# Patient Record
Sex: MALE | Race: WHITE | NOT HISPANIC OR LATINO | Employment: FULL TIME | URBAN - METROPOLITAN AREA
[De-identification: names, ages, dates, MRNs, and addresses within clinical notes are randomized per-mention and may not be internally consistent; named-entity substitution may affect disease eponyms.]

---

## 2021-09-17 ENCOUNTER — OFFICE VISIT (OUTPATIENT)
Dept: UROLOGY | Facility: CLINIC | Age: 56
End: 2021-09-17

## 2021-09-17 VITALS
DIASTOLIC BLOOD PRESSURE: 90 MMHG | HEART RATE: 79 BPM | WEIGHT: 240 LBS | TEMPERATURE: 97.6 F | HEIGHT: 76 IN | BODY MASS INDEX: 29.22 KG/M2 | SYSTOLIC BLOOD PRESSURE: 135 MMHG

## 2021-09-17 DIAGNOSIS — C61 PROSTATE CANCER (HCC): Primary | ICD-10-CM

## 2021-09-17 LAB
BACTERIA UR QL AUTO: NORMAL /HPF
EPI CELLS #/AREA URNS HPF: 0 /[HPF]
PSA SERPL-MCNC: <0.014 NG/ML (ref 0–4)
RBC # UR STRIP: NORMAL /HPF
RENAL EPITHELIAL, POC: 0
UNIDENT CRYS URNS QL MICRO: NORMAL /HPF
WBC # UR STRIP: NORMAL /HPF

## 2021-09-17 PROCEDURE — 99203 OFFICE O/P NEW LOW 30 MIN: CPT | Performed by: UROLOGY

## 2021-09-17 PROCEDURE — 84153 ASSAY OF PSA TOTAL: CPT | Performed by: UROLOGY

## 2021-09-17 PROCEDURE — 81015 MICROSCOPIC EXAM OF URINE: CPT | Performed by: UROLOGY

## 2021-09-17 RX ORDER — CALCIUM CARBONATE 300MG(750)
400 TABLET,CHEWABLE ORAL DAILY
COMMUNITY
End: 2022-04-13 | Stop reason: ALTCHOICE

## 2021-09-17 RX ORDER — CHLORTHALIDONE 25 MG/1
25 TABLET ORAL DAILY
COMMUNITY
End: 2022-03-18 | Stop reason: SDUPTHER

## 2021-09-17 RX ORDER — AMLODIPINE BESYLATE 10 MG/1
10 TABLET ORAL DAILY
COMMUNITY
End: 2021-12-20 | Stop reason: SDUPTHER

## 2021-09-17 RX ORDER — ACETAMINOPHEN 325 MG/1
650 TABLET ORAL EVERY 6 HOURS PRN
COMMUNITY
End: 2021-09-27

## 2021-09-17 RX ORDER — METOPROLOL TARTRATE 100 MG/1
100 TABLET ORAL 2 TIMES DAILY
COMMUNITY
End: 2021-09-21

## 2021-09-17 RX ORDER — TADALAFIL 5 MG/1
5 TABLET ORAL DAILY PRN
COMMUNITY
End: 2021-09-21

## 2021-09-17 RX ORDER — ROSUVASTATIN CALCIUM 20 MG/1
20 TABLET, COATED ORAL DAILY
COMMUNITY
End: 2021-10-19 | Stop reason: SDUPTHER

## 2021-09-17 NOTE — PROGRESS NOTES
Chief Complaint  Prostate Cancer (21 psa 0.1 ,2/3/21 psa 0.1)    Subjective No acute pain        Peter Crook presents to [facility] UROLOGY  History of Present Illness    Patient has prostate cancer and history of prostate cancer in his father and uncles from both sides.  He had elevated PSA and underwent biopsy in 2019.  Patient had sepsis after the biopsy and continue to regular tract infection still had TUR prostate done in 2020.  Biopsy was done because of elevated PSA and it was negative for prostate cancer.  On history of prostate he had Talala grade 3+4 prostate carcinoma and underwent radical prostatectomy on 2020.  He was found to have positive lymph nodes in the pelvis.  Patient underwent radiation in 2020 till 2020.  His PSA in 2021 was less than 0.1.  Patient has been on Depo-Lupron and moved to [place] in 2021.    Has been having some fatigue, dry mouth, incontinence is worse than before and since mid July he has been having terminal gross hematuria.  No dysuria.  Has been having incontinence and wearing thick pads about 4 pads a day.  No incontinence at nighttime so obviously related to weightbearing.  No fever or chills but does have hot flashes.  No nausea vomiting and his appetite is down.  His father was diagnosed with prostate cancer at age 56 and  at age 82.  Patient also has erectile dysfunction.    Objective No acute distress.    Personal history.  Patient is  and has 1 son 29-year-old.  He is ex-smoker used to smoke 20 half pack a day for 16 years.  Drinks sometimes.    Past surgical history.  Appendectomy at 7 years of age.  TUR prostate as above.  He had bilateral vasectomy spermatocelectomy and hydrocelectomy.  Cystoscopy for benign growth    Colonoscopy    Past medical history cardiovascular system.  He has atrial fibrillation and is on Eliquis 5 mg twice a day.  Vital Signs:   /90   Pulse 79   " Temp 97.6 °F (36.4 °C)   Ht 193 cm (76\")   Wt 109 kg (240 lb)   BMI 29.21 kg/m²     No Known Allergies   Review of Systems   Constitutional: Positive for fatigue.   HENT: Negative.    Eyes: Negative.    Respiratory: Negative.    Cardiovascular: Positive for palpitations.   Gastrointestinal: Negative.    Endocrine: Negative.    Genitourinary: Negative.         Urinary incontinence   Musculoskeletal: Negative.    Skin: Negative.    Allergic/Immunologic: Negative.    Neurological: Negative.    Hematological: Negative.    Psychiatric/Behavioral: Negative.    All other systems reviewed and are negative.       Physical Exam  Constitutional:       Appearance: Normal appearance. He is normal weight.   HENT:      Head: Normocephalic and atraumatic.      Nose: Nose normal.   Cardiovascular:      Rate and Rhythm: Normal rate. Rhythm irregular.      Pulses: Normal pulses.      Heart sounds: Normal heart sounds. No murmur heard.     Pulmonary:      Effort: Pulmonary effort is normal. No respiratory distress.      Breath sounds: Normal breath sounds. No rhonchi or rales.   Abdominal:      General: Abdomen is flat. Bowel sounds are normal. There is no distension.      Palpations: Abdomen is soft.      Tenderness: There is no abdominal tenderness. There is no right CVA tenderness or left CVA tenderness.   Genitourinary:     Penis: Normal.       Testes: Normal.      Rectum: Normal.      Comments: Prostate gland is surgically absent  Musculoskeletal:         General: No swelling or deformity. Normal range of motion.      Cervical back: Normal range of motion and neck supple. No rigidity or tenderness.   Lymphadenopathy:      Cervical: No cervical adenopathy.   Skin:     General: Skin is warm.      Coloration: Skin is not jaundiced.   Neurological:      General: No focal deficit present.      Mental Status: He is alert and oriented to person, place, and time.      Motor: No weakness.      Gait: Gait normal.   Psychiatric:         " Mood and Affect: Mood normal.         Behavior: Behavior normal.         Thought Content: Thought content normal.         Judgment: Judgment normal.        Result Review :                 Assessment and Plan    Diagnoses and all orders for this visit:    1. Prostate cancer (CMS/HCC) (Primary)  -     POC Urinalysis Dipstick, Automated  -     PSA Diagnostic; Future    2.  Terminal hematuria    3.  Urinary incontinence    Discussed the situation with the patient and nursing has we can get authorization will restart him on Depo-Lupron.  I am going to do CBC and CMP along with H1 see to make sure were not dealing with diabetes mellitus.  I think the patient should also be on apalutamide along with Depo-Lupron.  We are going to cystoscopy on him because of history of a benign bladder tumor and terminal hematuria.  Radiation cystitis will be another possibility    Follow Up   No follow-ups on file.  Patient was given instructions and counseling regarding his condition or for health maintenance advice. Please see specific information pulled into the AVS if appropriate.     Filiberto Sykes MD

## 2021-09-20 ENCOUNTER — APPOINTMENT (OUTPATIENT)
Dept: GENERAL RADIOLOGY | Facility: HOSPITAL | Age: 56
End: 2021-09-20

## 2021-09-20 ENCOUNTER — LAB (OUTPATIENT)
Dept: LAB | Facility: HOSPITAL | Age: 56
End: 2021-09-20

## 2021-09-20 ENCOUNTER — APPOINTMENT (OUTPATIENT)
Dept: LAB | Facility: HOSPITAL | Age: 56
End: 2021-09-20

## 2021-09-20 ENCOUNTER — HOSPITAL ENCOUNTER (INPATIENT)
Facility: HOSPITAL | Age: 56
LOS: 2 days | Discharge: HOME | End: 2021-09-23
Attending: EMERGENCY MEDICINE | Admitting: INTERNAL MEDICINE

## 2021-09-20 DIAGNOSIS — C61 PROSTATE CANCER (HCC): ICD-10-CM

## 2021-09-20 DIAGNOSIS — C61 PROSTATE CANCER (HCC): Primary | ICD-10-CM

## 2021-09-20 DIAGNOSIS — E11.9 NEW ONSET TYPE 2 DIABETES MELLITUS (HCC): ICD-10-CM

## 2021-09-20 DIAGNOSIS — R73.9 HYPERGLYCEMIA: Primary | ICD-10-CM

## 2021-09-20 LAB
ACETONE BLD QL: NEGATIVE
ALBUMIN SERPL-MCNC: 4.2 G/DL (ref 3.5–5.2)
ALBUMIN SERPL-MCNC: 4.3 G/DL (ref 3.5–5.2)
ALBUMIN/GLOB SERPL: 1.4 G/DL
ALBUMIN/GLOB SERPL: 1.5 G/DL
ALP SERPL-CCNC: 129 U/L (ref 39–117)
ALP SERPL-CCNC: 147 U/L (ref 39–117)
ALT SERPL W P-5'-P-CCNC: 64 U/L (ref 1–41)
ALT SERPL W P-5'-P-CCNC: 69 U/L (ref 1–41)
ANION GAP SERPL CALCULATED.3IONS-SCNC: 12.4 MMOL/L (ref 5–15)
ANION GAP SERPL CALCULATED.3IONS-SCNC: 12.9 MMOL/L (ref 5–15)
AST SERPL-CCNC: 54 U/L (ref 1–40)
AST SERPL-CCNC: 56 U/L (ref 1–40)
BASOPHILS # BLD AUTO: 0.02 10*3/MM3 (ref 0–0.2)
BASOPHILS # BLD AUTO: 0.03 10*3/MM3 (ref 0–0.2)
BASOPHILS NFR BLD AUTO: 0.6 % (ref 0–1.5)
BASOPHILS NFR BLD AUTO: 0.7 % (ref 0–1.5)
BILIRUB SERPL-MCNC: 0.5 MG/DL (ref 0–1.2)
BILIRUB SERPL-MCNC: 0.6 MG/DL (ref 0–1.2)
BILIRUB UR QL STRIP: NEGATIVE
BUN SERPL-MCNC: 15 MG/DL (ref 6–20)
BUN SERPL-MCNC: 16 MG/DL (ref 6–20)
BUN/CREAT SERPL: 15.2 (ref 7–25)
BUN/CREAT SERPL: 18.8 (ref 7–25)
CALCIUM SPEC-SCNC: 10.2 MG/DL (ref 8.6–10.5)
CALCIUM SPEC-SCNC: 9.7 MG/DL (ref 8.6–10.5)
CHLORIDE SERPL-SCNC: 91 MMOL/L (ref 98–107)
CHLORIDE SERPL-SCNC: 93 MMOL/L (ref 98–107)
CLARITY UR: CLEAR
CO2 SERPL-SCNC: 27.1 MMOL/L (ref 22–29)
CO2 SERPL-SCNC: 27.6 MMOL/L (ref 22–29)
COLOR UR: YELLOW
CREAT SERPL-MCNC: 0.85 MG/DL (ref 0.76–1.27)
CREAT SERPL-MCNC: 0.99 MG/DL (ref 0.76–1.27)
DEPRECATED RDW RBC AUTO: 40.9 FL (ref 37–54)
DEPRECATED RDW RBC AUTO: 42.8 FL (ref 37–54)
EOSINOPHIL # BLD AUTO: 0.05 10*3/MM3 (ref 0–0.4)
EOSINOPHIL # BLD AUTO: 0.05 10*3/MM3 (ref 0–0.4)
EOSINOPHIL NFR BLD AUTO: 1.2 % (ref 0.3–6.2)
EOSINOPHIL NFR BLD AUTO: 1.4 % (ref 0.3–6.2)
ERYTHROCYTE [DISTWIDTH] IN BLOOD BY AUTOMATED COUNT: 13.7 % (ref 12.3–15.4)
ERYTHROCYTE [DISTWIDTH] IN BLOOD BY AUTOMATED COUNT: 13.8 % (ref 12.3–15.4)
GFR SERPL CREATININE-BSD FRML MDRD: 78 ML/MIN/1.73
GFR SERPL CREATININE-BSD FRML MDRD: 94 ML/MIN/1.73
GLOBULIN UR ELPH-MCNC: 2.8 GM/DL
GLOBULIN UR ELPH-MCNC: 2.9 GM/DL
GLUCOSE BLDC GLUCOMTR-MCNC: >500 MG/DL (ref 70–99)
GLUCOSE BLDC GLUCOMTR-MCNC: >500 MG/DL (ref 70–99)
GLUCOSE SERPL-MCNC: 597 MG/DL (ref 65–99)
GLUCOSE SERPL-MCNC: 658 MG/DL (ref 65–99)
GLUCOSE UR STRIP-MCNC: ABNORMAL MG/DL
HCT VFR BLD AUTO: 37.2 % (ref 37.5–51)
HCT VFR BLD AUTO: 39.6 % (ref 37.5–51)
HGB BLD-MCNC: 12.5 G/DL (ref 13–17.7)
HGB BLD-MCNC: 12.8 G/DL (ref 13–17.7)
HGB UR QL STRIP.AUTO: NEGATIVE
HOLD SPECIMEN: NORMAL
HOLD SPECIMEN: NORMAL
IMM GRANULOCYTES # BLD AUTO: 0.01 10*3/MM3 (ref 0–0.05)
IMM GRANULOCYTES # BLD AUTO: 0.01 10*3/MM3 (ref 0–0.05)
IMM GRANULOCYTES NFR BLD AUTO: 0.2 % (ref 0–0.5)
IMM GRANULOCYTES NFR BLD AUTO: 0.3 % (ref 0–0.5)
KETONES UR QL STRIP: NEGATIVE
LEUKOCYTE ESTERASE UR QL STRIP.AUTO: NEGATIVE
LYMPHOCYTES # BLD AUTO: 0.76 10*3/MM3 (ref 0.7–3.1)
LYMPHOCYTES # BLD AUTO: 0.93 10*3/MM3 (ref 0.7–3.1)
LYMPHOCYTES NFR BLD AUTO: 21.3 % (ref 19.6–45.3)
LYMPHOCYTES NFR BLD AUTO: 22.6 % (ref 19.6–45.3)
MAGNESIUM SERPL-MCNC: 2.1 MG/DL (ref 1.6–2.6)
MCH RBC QN AUTO: 27.6 PG (ref 26.6–33)
MCH RBC QN AUTO: 27.7 PG (ref 26.6–33)
MCHC RBC AUTO-ENTMCNC: 32.3 G/DL (ref 31.5–35.7)
MCHC RBC AUTO-ENTMCNC: 33.6 G/DL (ref 31.5–35.7)
MCV RBC AUTO: 82.3 FL (ref 79–97)
MCV RBC AUTO: 85.5 FL (ref 79–97)
MONOCYTES # BLD AUTO: 0.25 10*3/MM3 (ref 0.1–0.9)
MONOCYTES # BLD AUTO: 0.27 10*3/MM3 (ref 0.1–0.9)
MONOCYTES NFR BLD AUTO: 6.6 % (ref 5–12)
MONOCYTES NFR BLD AUTO: 7 % (ref 5–12)
NEUTROPHILS NFR BLD AUTO: 2.48 10*3/MM3 (ref 1.7–7)
NEUTROPHILS NFR BLD AUTO: 2.82 10*3/MM3 (ref 1.7–7)
NEUTROPHILS NFR BLD AUTO: 68.7 % (ref 42.7–76)
NEUTROPHILS NFR BLD AUTO: 69.4 % (ref 42.7–76)
NITRITE UR QL STRIP: NEGATIVE
NRBC BLD AUTO-RTO: 0 /100 WBC (ref 0–0.2)
NRBC BLD AUTO-RTO: 0 /100 WBC (ref 0–0.2)
PH UR STRIP.AUTO: 7 [PH] (ref 5–8)
PLATELET # BLD AUTO: 159 10*3/MM3 (ref 140–450)
PLATELET # BLD AUTO: 160 10*3/MM3 (ref 140–450)
PMV BLD AUTO: 10.1 FL (ref 6–12)
PMV BLD AUTO: 10.9 FL (ref 6–12)
POTASSIUM SERPL-SCNC: 4.4 MMOL/L (ref 3.5–5.2)
POTASSIUM SERPL-SCNC: 4.8 MMOL/L (ref 3.5–5.2)
PROT SERPL-MCNC: 7.1 G/DL (ref 6–8.5)
PROT SERPL-MCNC: 7.1 G/DL (ref 6–8.5)
PROT UR QL STRIP: NEGATIVE
PSA SERPL-MCNC: <0.014 NG/ML (ref 0–4)
RBC # BLD AUTO: 4.52 10*6/MM3 (ref 4.14–5.8)
RBC # BLD AUTO: 4.63 10*6/MM3 (ref 4.14–5.8)
SODIUM SERPL-SCNC: 131 MMOL/L (ref 136–145)
SODIUM SERPL-SCNC: 133 MMOL/L (ref 136–145)
SP GR UR STRIP: >1.03 (ref 1–1.03)
UROBILINOGEN UR QL STRIP: ABNORMAL
WBC # BLD AUTO: 3.57 10*3/MM3 (ref 3.4–10.8)
WBC # BLD AUTO: 4.11 10*3/MM3 (ref 3.4–10.8)
WHOLE BLOOD HOLD SPECIMEN: NORMAL
WHOLE BLOOD HOLD SPECIMEN: NORMAL

## 2021-09-20 PROCEDURE — 93010 ELECTROCARDIOGRAM REPORT: CPT | Performed by: INTERNAL MEDICINE

## 2021-09-20 PROCEDURE — 36415 COLL VENOUS BLD VENIPUNCTURE: CPT

## 2021-09-20 PROCEDURE — 99285 EMERGENCY DEPT VISIT HI MDM: CPT

## 2021-09-20 PROCEDURE — 99223 1ST HOSP IP/OBS HIGH 75: CPT | Performed by: FAMILY MEDICINE

## 2021-09-20 PROCEDURE — 93005 ELECTROCARDIOGRAM TRACING: CPT | Performed by: EMERGENCY MEDICINE

## 2021-09-20 PROCEDURE — 83735 ASSAY OF MAGNESIUM: CPT | Performed by: EMERGENCY MEDICINE

## 2021-09-20 PROCEDURE — 80053 COMPREHEN METABOLIC PANEL: CPT | Performed by: EMERGENCY MEDICINE

## 2021-09-20 PROCEDURE — 82962 GLUCOSE BLOOD TEST: CPT

## 2021-09-20 PROCEDURE — 85025 COMPLETE CBC W/AUTO DIFF WBC: CPT

## 2021-09-20 PROCEDURE — 81003 URINALYSIS AUTO W/O SCOPE: CPT

## 2021-09-20 PROCEDURE — 82009 KETONE BODYS QUAL: CPT

## 2021-09-20 PROCEDURE — 84153 ASSAY OF PSA TOTAL: CPT

## 2021-09-20 PROCEDURE — 71045 X-RAY EXAM CHEST 1 VIEW: CPT

## 2021-09-20 PROCEDURE — 80053 COMPREHEN METABOLIC PANEL: CPT

## 2021-09-20 PROCEDURE — 83036 HEMOGLOBIN GLYCOSYLATED A1C: CPT | Performed by: FAMILY MEDICINE

## 2021-09-20 PROCEDURE — 63710000001 INSULIN LISPRO (HUMAN) PER 5 UNITS: Performed by: EMERGENCY MEDICINE

## 2021-09-20 RX ORDER — SODIUM CHLORIDE 0.9 % (FLUSH) 0.9 %
10 SYRINGE (ML) INJECTION AS NEEDED
Status: DISCONTINUED | OUTPATIENT
Start: 2021-09-20 | End: 2021-09-21

## 2021-09-20 RX ADMIN — INSULIN LISPRO 7 UNITS: 100 INJECTION, SOLUTION INTRAVENOUS; SUBCUTANEOUS at 23:12

## 2021-09-20 RX ADMIN — SODIUM CHLORIDE 1000 ML: 9 INJECTION, SOLUTION INTRAVENOUS at 23:13

## 2021-09-21 LAB
ALBUMIN SERPL-MCNC: 4.2 G/DL (ref 3.5–5.2)
ALBUMIN/GLOB SERPL: 1.7 G/DL
ALP SERPL-CCNC: 114 U/L (ref 39–117)
ALT SERPL W P-5'-P-CCNC: 61 U/L (ref 1–41)
ANION GAP SERPL CALCULATED.3IONS-SCNC: 8.4 MMOL/L (ref 5–15)
AST SERPL-CCNC: 57 U/L (ref 1–40)
BASOPHILS # BLD AUTO: 0.03 10*3/MM3 (ref 0–0.2)
BASOPHILS NFR BLD AUTO: 0.8 % (ref 0–1.5)
BILIRUB SERPL-MCNC: 0.5 MG/DL (ref 0–1.2)
BUN SERPL-MCNC: 14 MG/DL (ref 6–20)
BUN/CREAT SERPL: 17.7 (ref 7–25)
CALCIUM SPEC-SCNC: 9.7 MG/DL (ref 8.6–10.5)
CHLORIDE SERPL-SCNC: 96 MMOL/L (ref 98–107)
CO2 SERPL-SCNC: 29.6 MMOL/L (ref 22–29)
CREAT SERPL-MCNC: 0.79 MG/DL (ref 0.76–1.27)
DEPRECATED RDW RBC AUTO: 40 FL (ref 37–54)
EOSINOPHIL # BLD AUTO: 0.07 10*3/MM3 (ref 0–0.4)
EOSINOPHIL NFR BLD AUTO: 1.9 % (ref 0.3–6.2)
ERYTHROCYTE [DISTWIDTH] IN BLOOD BY AUTOMATED COUNT: 13.8 % (ref 12.3–15.4)
GFR SERPL CREATININE-BSD FRML MDRD: 102 ML/MIN/1.73
GLOBULIN UR ELPH-MCNC: 2.5 GM/DL
GLUCOSE BLDC GLUCOMTR-MCNC: 231 MG/DL (ref 70–99)
GLUCOSE BLDC GLUCOMTR-MCNC: 244 MG/DL (ref 70–99)
GLUCOSE BLDC GLUCOMTR-MCNC: 247 MG/DL (ref 70–99)
GLUCOSE BLDC GLUCOMTR-MCNC: 275 MG/DL (ref 70–99)
GLUCOSE BLDC GLUCOMTR-MCNC: 285 MG/DL (ref 70–99)
GLUCOSE BLDC GLUCOMTR-MCNC: 295 MG/DL (ref 70–99)
GLUCOSE BLDC GLUCOMTR-MCNC: 297 MG/DL (ref 70–99)
GLUCOSE BLDC GLUCOMTR-MCNC: 303 MG/DL (ref 70–99)
GLUCOSE BLDC GLUCOMTR-MCNC: 316 MG/DL (ref 70–99)
GLUCOSE BLDC GLUCOMTR-MCNC: 325 MG/DL (ref 70–99)
GLUCOSE BLDC GLUCOMTR-MCNC: 335 MG/DL (ref 70–99)
GLUCOSE BLDC GLUCOMTR-MCNC: 339 MG/DL (ref 70–99)
GLUCOSE BLDC GLUCOMTR-MCNC: 346 MG/DL (ref 70–99)
GLUCOSE BLDC GLUCOMTR-MCNC: 430 MG/DL (ref 70–99)
GLUCOSE BLDC GLUCOMTR-MCNC: >500 MG/DL (ref 70–99)
GLUCOSE SERPL-MCNC: 307 MG/DL (ref 65–99)
HBA1C MFR BLD: 12.2 % (ref 4.8–5.6)
HCT VFR BLD AUTO: 37.3 % (ref 37.5–51)
HGB BLD-MCNC: 12.7 G/DL (ref 13–17.7)
HYPOCHROMIA BLD QL: NORMAL
IMM GRANULOCYTES # BLD AUTO: 0.01 10*3/MM3 (ref 0–0.05)
IMM GRANULOCYTES NFR BLD AUTO: 0.3 % (ref 0–0.5)
LYMPHOCYTES # BLD AUTO: 0.75 10*3/MM3 (ref 0.7–3.1)
LYMPHOCYTES NFR BLD AUTO: 20.2 % (ref 19.6–45.3)
MCH RBC QN AUTO: 27.7 PG (ref 26.6–33)
MCHC RBC AUTO-ENTMCNC: 34 G/DL (ref 31.5–35.7)
MCV RBC AUTO: 81.4 FL (ref 79–97)
MICROCYTES BLD QL: NORMAL
MONOCYTES # BLD AUTO: 0.23 10*3/MM3 (ref 0.1–0.9)
MONOCYTES NFR BLD AUTO: 6.2 % (ref 5–12)
NEUTROPHILS NFR BLD AUTO: 2.62 10*3/MM3 (ref 1.7–7)
NEUTROPHILS NFR BLD AUTO: 70.6 % (ref 42.7–76)
NRBC BLD AUTO-RTO: 0 /100 WBC (ref 0–0.2)
PH BLDV: 7.39 PH UNITS (ref 7.31–7.41)
PLATELET # BLD AUTO: 176 10*3/MM3 (ref 140–450)
PMV BLD AUTO: 11.6 FL (ref 6–12)
POTASSIUM SERPL-SCNC: 3.7 MMOL/L (ref 3.5–5.2)
PROT SERPL-MCNC: 6.7 G/DL (ref 6–8.5)
QT INTERVAL: 458 MS
RBC # BLD AUTO: 4.58 10*6/MM3 (ref 4.14–5.8)
SMALL PLATELETS BLD QL SMEAR: NORMAL
SODIUM SERPL-SCNC: 134 MMOL/L (ref 136–145)
WBC # BLD AUTO: 3.71 10*3/MM3 (ref 3.4–10.8)
WBC MORPH BLD: NORMAL

## 2021-09-21 PROCEDURE — 85025 COMPLETE CBC W/AUTO DIFF WBC: CPT | Performed by: FAMILY MEDICINE

## 2021-09-21 PROCEDURE — 82800 BLOOD PH: CPT | Performed by: EMERGENCY MEDICINE

## 2021-09-21 PROCEDURE — 82962 GLUCOSE BLOOD TEST: CPT

## 2021-09-21 PROCEDURE — 80053 COMPREHEN METABOLIC PANEL: CPT | Performed by: FAMILY MEDICINE

## 2021-09-21 PROCEDURE — 85007 BL SMEAR W/DIFF WBC COUNT: CPT | Performed by: FAMILY MEDICINE

## 2021-09-21 PROCEDURE — 63710000001 INSULIN DETEMIR PER 5 UNITS: Performed by: INTERNAL MEDICINE

## 2021-09-21 PROCEDURE — 99233 SBSQ HOSP IP/OBS HIGH 50: CPT | Performed by: INTERNAL MEDICINE

## 2021-09-21 PROCEDURE — 63710000001 INSULIN LISPRO (HUMAN) PER 5 UNITS: Performed by: INTERNAL MEDICINE

## 2021-09-21 RX ORDER — SODIUM CHLORIDE 0.9 % (FLUSH) 0.9 %
10 SYRINGE (ML) INJECTION AS NEEDED
Status: DISCONTINUED | OUTPATIENT
Start: 2021-09-21 | End: 2021-09-23 | Stop reason: HOSPADM

## 2021-09-21 RX ORDER — CHLORTHALIDONE 25 MG/1
25 TABLET ORAL DAILY
Status: DISCONTINUED | OUTPATIENT
Start: 2021-09-21 | End: 2021-09-23 | Stop reason: HOSPADM

## 2021-09-21 RX ORDER — POLYETHYLENE GLYCOL 3350 17 G/17G
17 POWDER, FOR SOLUTION ORAL DAILY PRN
Status: DISCONTINUED | OUTPATIENT
Start: 2021-09-21 | End: 2021-09-23 | Stop reason: HOSPADM

## 2021-09-21 RX ORDER — SODIUM CHLORIDE 0.9 % (FLUSH) 0.9 %
10 SYRINGE (ML) INJECTION EVERY 12 HOURS SCHEDULED
Status: DISCONTINUED | OUTPATIENT
Start: 2021-09-21 | End: 2021-09-23 | Stop reason: HOSPADM

## 2021-09-21 RX ORDER — AMOXICILLIN 250 MG
2 CAPSULE ORAL 2 TIMES DAILY
Status: DISCONTINUED | OUTPATIENT
Start: 2021-09-21 | End: 2021-09-23 | Stop reason: HOSPADM

## 2021-09-21 RX ORDER — NICOTINE POLACRILEX 4 MG
15 LOZENGE BUCCAL
Status: DISCONTINUED | OUTPATIENT
Start: 2021-09-21 | End: 2021-09-22

## 2021-09-21 RX ORDER — VALSARTAN 80 MG/1
320 TABLET ORAL DAILY
Status: DISCONTINUED | OUTPATIENT
Start: 2021-09-21 | End: 2021-09-23 | Stop reason: HOSPADM

## 2021-09-21 RX ORDER — ACETAMINOPHEN 650 MG/1
650 SUPPOSITORY RECTAL EVERY 4 HOURS PRN
Status: DISCONTINUED | OUTPATIENT
Start: 2021-09-21 | End: 2021-09-23 | Stop reason: HOSPADM

## 2021-09-21 RX ORDER — DEXTROSE MONOHYDRATE 100 MG/ML
50-250 INJECTION, SOLUTION INTRAVENOUS
Status: DISCONTINUED | OUTPATIENT
Start: 2021-09-21 | End: 2021-09-22

## 2021-09-21 RX ORDER — ACETAMINOPHEN 325 MG/1
650 TABLET ORAL EVERY 4 HOURS PRN
Status: DISCONTINUED | OUTPATIENT
Start: 2021-09-21 | End: 2021-09-23 | Stop reason: HOSPADM

## 2021-09-21 RX ORDER — BISACODYL 10 MG
10 SUPPOSITORY, RECTAL RECTAL DAILY PRN
Status: DISCONTINUED | OUTPATIENT
Start: 2021-09-21 | End: 2021-09-23 | Stop reason: HOSPADM

## 2021-09-21 RX ORDER — METOPROLOL SUCCINATE 50 MG/1
100 TABLET, EXTENDED RELEASE ORAL
Status: DISCONTINUED | OUTPATIENT
Start: 2021-09-21 | End: 2021-09-23 | Stop reason: HOSPADM

## 2021-09-21 RX ORDER — BISACODYL 5 MG/1
5 TABLET, DELAYED RELEASE ORAL DAILY PRN
Status: DISCONTINUED | OUTPATIENT
Start: 2021-09-21 | End: 2021-09-23 | Stop reason: HOSPADM

## 2021-09-21 RX ORDER — METOPROLOL SUCCINATE 200 MG/1
100 TABLET, EXTENDED RELEASE ORAL 2 TIMES DAILY
COMMUNITY
End: 2021-10-19 | Stop reason: SDUPTHER

## 2021-09-21 RX ORDER — ONDANSETRON 2 MG/ML
4 INJECTION INTRAMUSCULAR; INTRAVENOUS EVERY 6 HOURS PRN
Status: DISCONTINUED | OUTPATIENT
Start: 2021-09-21 | End: 2021-09-23 | Stop reason: HOSPADM

## 2021-09-21 RX ORDER — ACETAMINOPHEN 325 MG/1
650 TABLET ORAL EVERY 6 HOURS PRN
Status: DISCONTINUED | OUTPATIENT
Start: 2021-09-21 | End: 2021-09-21

## 2021-09-21 RX ORDER — AMLODIPINE BESYLATE 5 MG/1
10 TABLET ORAL DAILY
Status: DISCONTINUED | OUTPATIENT
Start: 2021-09-21 | End: 2021-09-23 | Stop reason: HOSPADM

## 2021-09-21 RX ORDER — ACETAMINOPHEN 160 MG/5ML
650 SOLUTION ORAL EVERY 4 HOURS PRN
Status: DISCONTINUED | OUTPATIENT
Start: 2021-09-21 | End: 2021-09-23 | Stop reason: HOSPADM

## 2021-09-21 RX ORDER — VALSARTAN 320 MG/1
320 TABLET ORAL DAILY
COMMUNITY
End: 2022-03-18 | Stop reason: SDUPTHER

## 2021-09-21 RX ORDER — SODIUM CHLORIDE 0.9 % (FLUSH) 0.9 %
3 SYRINGE (ML) INJECTION EVERY 12 HOURS SCHEDULED
Status: DISCONTINUED | OUTPATIENT
Start: 2021-09-21 | End: 2021-09-23 | Stop reason: HOSPADM

## 2021-09-21 RX ORDER — ROSUVASTATIN CALCIUM 20 MG/1
20 TABLET, COATED ORAL NIGHTLY
Status: DISCONTINUED | OUTPATIENT
Start: 2021-09-21 | End: 2021-09-23 | Stop reason: HOSPADM

## 2021-09-21 RX ADMIN — INSULIN LISPRO 8 UNITS: 100 INJECTION, SOLUTION INTRAVENOUS; SUBCUTANEOUS at 13:29

## 2021-09-21 RX ADMIN — CHLORTHALIDONE 25 MG: 25 TABLET ORAL at 08:44

## 2021-09-21 RX ADMIN — INSULIN LISPRO 8 UNITS: 100 INJECTION, SOLUTION INTRAVENOUS; SUBCUTANEOUS at 18:26

## 2021-09-21 RX ADMIN — APIXABAN 5 MG: 5 TABLET, FILM COATED ORAL at 20:12

## 2021-09-21 RX ADMIN — SODIUM CHLORIDE, PRESERVATIVE FREE 10 ML: 5 INJECTION INTRAVENOUS at 08:45

## 2021-09-21 RX ADMIN — APIXABAN 5 MG: 5 TABLET, FILM COATED ORAL at 09:08

## 2021-09-21 RX ADMIN — INSULIN DETEMIR 15 UNITS: 100 INJECTION, SOLUTION SUBCUTANEOUS at 13:28

## 2021-09-21 RX ADMIN — INSULIN DETEMIR 15 UNITS: 100 INJECTION, SOLUTION SUBCUTANEOUS at 20:13

## 2021-09-21 RX ADMIN — INSULIN HUMAN 5.7 UNITS/HR: 1 INJECTION, SOLUTION INTRAVENOUS at 05:46

## 2021-09-21 RX ADMIN — ROSUVASTATIN 20 MG: 20 TABLET, FILM COATED ORAL at 20:12

## 2021-09-21 RX ADMIN — SODIUM CHLORIDE, PRESERVATIVE FREE 3 ML: 5 INJECTION INTRAVENOUS at 20:14

## 2021-09-21 RX ADMIN — SODIUM CHLORIDE, PRESERVATIVE FREE 10 ML: 5 INJECTION INTRAVENOUS at 20:14

## 2021-09-21 RX ADMIN — VALSARTAN 320 MG: 80 TABLET, FILM COATED ORAL at 16:21

## 2021-09-21 RX ADMIN — AMLODIPINE BESYLATE 10 MG: 5 TABLET ORAL at 09:08

## 2021-09-21 RX ADMIN — METOPROLOL SUCCINATE 100 MG: 50 TABLET, EXTENDED RELEASE ORAL at 16:21

## 2021-09-21 NOTE — CONSULTS
"Nutrition Services    Patient Name: Peter Crook  YOB: 1965  MRN: [id number]  Admission date: 9/20/2021      CLINICAL NUTRITION ASSESSMENT      Reason for Assessment  diagnosis-new onset diabetes     H&P:    Past Medical History:   Diagnosis Date   • Adenocarcinoma of prostate (CMS/HCC)     negetive prostate biopsy march 2019    • Anxiety    • Bladder diverticulum    • Depression    • Dyspnea    • ED (erectile dysfunction)    • Hyperlipidemia    • Hypertension    • Spermatocele         Current Problems:   Active Hospital Problems    Diagnosis    • Hyperglycemia         Nutrition/Diet History         Narrative     Patient describes polyuria, polydipsia, some weight loss prior to admission.  Patient states he is on long-term out-of-town assignment.  Lives in apartment where he procures breakfast and lunch from grocery store, requires minimal food preparation but usually eats dinner at a privately owned restaurants in [place].  He describes usual intake, seeks low-salt items.  He has cereal or yogurt and fruit for breakfast.  He has sandwich for lunch with chips.  He has a full meal for dinner. Snacking on fruit throughout the day, uses diet drinks.  Some chocolate intake.  Reports he has been keeping fiber intake low secondary to recent history of radiation treatment.       Anthropometrics        Current Height, Weight Height: 193 cm (76\")  Weight: 110 kg (242 lb 4.6 oz)   Current BMI Body mass index is 29.49 kg/m².       Weight Hx  Wt Readings from Last 30 Encounters:   09/21/21 0753 110 kg (242 lb 4.6 oz)   09/20/21 2126 110 kg (241 lb 6.5 oz)   09/17/21 1425 109 kg (240 lb)            Wt Change Observation      Estimated/Assessed Needs       Energy Requirements    EST Needs (kcal/day) 2200-2500kcal       Protein Requirements    EST Daily Needs (g/day) 87-100g       Fluid Requirements     Estimated Needs (mL/day) 2200-2500ml     Labs/Medications         Pertinent Labs Reviewed.  Glucose 381H this " morning   Results from last 7 days   Lab Units 09/20/21 2138 09/20/21  0905   SODIUM mmol/L 133* 131*   POTASSIUM mmol/L 4.4 4.8   CHLORIDE mmol/L 93* 91*   CO2 mmol/L 27.6 27.1   BUN mg/dL 16 15   CREATININE mg/dL 0.85 0.99   CALCIUM mg/dL 10.2 9.7   BILIRUBIN mg/dL 0.5 0.6   ALK PHOS U/L 147* 129*   ALT (SGPT) U/L 64* 69*   AST (SGOT) U/L 54* 56*   GLUCOSE mg/dL 658* 597*     Results from last 7 days   Lab Units 09/21/21  0618 09/20/21 2138 09/20/21 2138   MAGNESIUM mg/dL  --   --  2.1   HEMOGLOBIN g/dL 12.7*   < > 12.5*   HEMATOCRIT % 37.3*   < > 37.2*    < > = values in this interval not displayed.     No results found for: COVID19  No results found for: HGBA1C      Pertinent Medications Reviewed.     Current Nutrition Orders & Evaluation of Intake       Oral Nutrition     Current PO Diet Diet Regular; Consistent Carbohydrate   Supplement No active supplement orders       Nutrition Diagnosis         Nutrition Dx Problem 1 Food and nutrition knowledge deficit related to No prior exposure as evidenced by patient report.       Nutrition Intervention         2200-calorie diet while in hospital.  M NT/diet education for home use     Medical Nutrition Therapy/Nutrition Education          Learner     Readiness Patient  Eager and Acceptance     Method     Response Explanation and Written Material  Verbalizes understanding     Monitor/Evaluation        Monitor Follow-up for questions       Nutrition Discharge Plan         Recommend outpatient diabetes education continue/DSME       Electronically signed by:  Sowmya Berry RD  09/21/21 09:31 EDT

## 2021-09-21 NOTE — ED PROVIDER NOTES
Time: 10:19 PM EDT  Arrived by: private car  Chief Complaint: hyperglycemia  History provided by: patient  History is limited by: N/A     History of Present Illness:  Patient is a 56 y.o. year old male that presents to the emergency department with hyperglycemia. Pt is from [place] and is here on business since July. Pt had prostatectomy July 2020 in [place] for cancer and was metastatic. Pt is on hormone blockers and had a round of radiation. Pt developed urinary symptoms where he had increased frequency, incontinence, and gross hematuria. Pt also started to have polyuria and polydipsia. Pt saw Dr. Sykes last week and the urologist called him tonight telling him to come to the ED. Pt has had hematuria before. Pt also c/o dryness of the skin and fatigue. Pt has been told he was pre-diabetic but wasn't started on medications.     Pt has a history of A-fib and hypertension. Pt is on Eliquis.       Hyperglycemia  Blood sugar level PTA:  Over 500  Severity:  Moderate  Onset quality:  Unable to specify  Timing:  Unable to specify  Progression:  Unable to specify  Chronicity:  New  Diabetes status:  Non-diabetic  Current diabetic therapy:  None  Relieved by:  None tried  Ineffective treatments:  None tried  Associated symptoms: fatigue, increased thirst and polyuria    Associated symptoms: no chest pain, no dysuria, no fever, no shortness of breath and no vomiting    Risk factors: no recent steroid use        Similar Symptoms Previously: no  Recently seen: yes      Patient Care Team  Primary Care Provider: Provider, No Known    Past Medical History:     No Known Allergies  Past Medical History:   Diagnosis Date   • Adenocarcinoma of prostate (CMS/HCC)     negetive prostate biopsy march 2019    • Anxiety    • Bladder diverticulum    • Depression    • Dyspnea    • ED (erectile dysfunction)    • Hyperlipidemia    • Hypertension    • Spermatocele      Past Surgical History:   Procedure Laterality Date   • APPENDECTOMY     • BLADDER  DIVERTICULECTOMY      july 2020    • HYDROCELECTOMY     • PROSTATECTOMY     • TURP / TRANSURETHRAL INCISION / DRAINAGE PROSTATE  02/11/2020   • VASECTOMY       Family History   Problem Relation Age of Onset   • Cancer Father    • Hypertension Father        Home Medications:  Prior to Admission medications    Medication Sig Start Date End Date Taking? Authorizing Provider   acetaminophen (TYLENOL) 325 MG tablet Take 650 mg by mouth Every 6 (Six) Hours As Needed for Mild Pain .    Nahum Jo MD   amLODIPine (NORVASC) 10 MG tablet Take 10 mg by mouth Daily.    Nahum Jo MD   apixaban (ELIQUIS) 5 MG tablet tablet Take 5 mg by mouth 2 (Two) Times a Day.    Nahum Jo MD   chlorthalidone (HYGROTON) 25 MG tablet Take 25 mg by mouth Daily.    Nahum Jo MD   leuprolide (LUPRON) 22.5 MG injection Inject 22.5 mg into the appropriate muscle as directed by prescriber Every 3 (Three) Months.    Nahum Jo MD   Magnesium 400 MG capsule Take  by mouth 3 (Three) Times a Day.    Nahum Jo MD   metoprolol tartrate (LOPRESSOR) 100 MG tablet Take 100 mg by mouth 2 (Two) Times a Day.    Nahum Jo MD   Multiple Vitamins-Minerals (EMERGEN-C VITAMIN C PO) Take  by mouth.    Nahum Jo MD   rosuvastatin (CRESTOR) 20 MG tablet Take 20 mg by mouth Daily.    Nahum Jo MD   tadalafil (CIALIS) 5 MG tablet Take 5 mg by mouth Daily As Needed for Erectile Dysfunction.    Nahum Jo MD        Social History:   Social History     Tobacco Use   • Smoking status: Former Smoker     Packs/day: 2.00     Years: 16.00     Pack years: 32.00     Types: Cigarettes   • Smokeless tobacco: Never Used   Vaping Use   • Vaping Use: Never used   Substance Use Topics   • Alcohol use: Not Currently   • Drug use: Never         Review of Systems:  Review of Systems   Constitutional: Positive for fatigue. Negative for chills and fever.   HENT: Negative for  "nosebleeds.    Eyes: Negative for redness.   Respiratory: Negative for cough and shortness of breath.    Cardiovascular: Negative for chest pain.   Gastrointestinal: Negative for diarrhea and vomiting.   Endocrine: Positive for polydipsia and polyuria.   Genitourinary: Negative for dysuria and frequency.   Musculoskeletal: Negative for back pain and neck pain.   Skin: Negative for rash.   Neurological: Negative for seizures.        Physical Exam:  /82 (BP Location: Right arm, Patient Position: Sitting)   Pulse 62   Temp 98.3 °F (36.8 °C) (Oral)   Resp 16   Ht 193 cm (76\")   Wt 110 kg (241 lb 6.5 oz)   SpO2 97%   BMI 29.38 kg/m²     Physical Exam  Vitals and nursing note reviewed.   Constitutional:       General: He is awake. He is not in acute distress.     Appearance: Normal appearance. He is not toxic-appearing.   HENT:      Head: Normocephalic and atraumatic.      Nose: Nose normal.      Mouth/Throat:      Mouth: Mucous membranes are dry.      Pharynx: Oropharynx is clear.      Comments: Mildly dry mucous membranes.   Eyes:      General: Lids are normal. No scleral icterus.     Conjunctiva/sclera: Conjunctivae normal.   Cardiovascular:      Rate and Rhythm: Normal rate. Rhythm irregular.      Pulses: Normal pulses.      Heart sounds: Normal heart sounds. No murmur heard.     Pulmonary:      Effort: Pulmonary effort is normal. No respiratory distress.      Breath sounds: Normal breath sounds and air entry. No wheezing, rhonchi or rales.      Comments: Lungs are clear bilaterally.   Chest:      Chest wall: No tenderness.   Abdominal:      General: A surgical scar is present.      Palpations: Abdomen is soft.      Tenderness: There is no abdominal tenderness. There is no guarding or rebound.      Comments: No rigidity.   Musculoskeletal:         General: No tenderness. Normal range of motion.      Cervical back: Normal range of motion and neck supple. No tenderness.      Right lower leg: No edema.      " Left lower leg: No edema.      Comments: Calves are non-tender.    Skin:     General: Skin is warm and dry.      Findings: No rash.   Neurological:      General: No focal deficit present.      Mental Status: He is alert and oriented to person, place, and time.      Sensory: Sensation is intact. No sensory deficit.      Motor: Motor function is intact. No weakness.   Psychiatric:         Mood and Affect: Mood normal.         Behavior: Behavior normal.                Medications in the Emergency Department:  Medications   sodium chloride 0.9 % flush 10 mL (has no administration in time range)   sodium chloride 0.9 % bolus 1,000 mL (1,000 mL Intravenous New Bag 9/20/21 2313)   insulin lispro (humaLOG) injection 7 Units (7 Units Subcutaneous Given 9/20/21 2312)        Labs  Lab Results (last 24 hours)     Procedure Component Value Units Date/Time    CBC & Differential [[id number]]  (Abnormal) Collected: 09/20/21 0905    Specimen: Blood Updated: 09/20/21 1902    Narrative:      The following orders were created for panel order CBC & Differential.  Procedure                               Abnormality         Status                     ---------                               -----------         ------                     CBC Auto Differential[[id number]]        Abnormal            Final result                 Please view results for these tests on the individual orders.    PSA Diagnostic [[id number]]  (Normal) Collected: 09/20/21 0905    Specimen: Blood Updated: 09/20/21 1954     PSA <0.014 ng/mL     Narrative:      Results may be falsely decreased if patient taking Biotin.      Comprehensive Metabolic Panel [[id number]]  (Abnormal) Collected: 09/20/21 0905    Specimen: Blood Updated: 09/20/21 2107     Glucose 597 mg/dL      BUN 15 mg/dL      Creatinine 0.99 mg/dL      Sodium 131 mmol/L      Potassium 4.8 mmol/L      Chloride 91 mmol/L      CO2 27.1 mmol/L      Calcium 9.7 mg/dL      Total Protein 7.1 g/dL      Albumin 4.30  g/dL      ALT (SGPT) 69 U/L      AST (SGOT) 56 U/L      Alkaline Phosphatase 129 U/L      Total Bilirubin 0.6 mg/dL      eGFR Non African Amer 78 mL/min/1.73      Globulin 2.8 gm/dL      A/G Ratio 1.5 g/dL      BUN/Creatinine Ratio 15.2     Anion Gap 12.9 mmol/L     Narrative:      GFR Normal >60  Chronic Kidney Disease <60  Kidney Failure <15      CBC Auto Differential [[id number]]  (Abnormal) Collected: 09/20/21 0905    Specimen: Blood Updated: 09/20/21 1902     WBC 4.11 10*3/mm3      RBC 4.63 10*6/mm3      Hemoglobin 12.8 g/dL      Hematocrit 39.6 %      MCV 85.5 fL      MCH 27.6 pg      MCHC 32.3 g/dL      RDW 13.7 %      RDW-SD 42.8 fl      MPV 10.9 fL      Platelets 159 10*3/mm3      Neutrophil % 68.7 %      Lymphocyte % 22.6 %      Monocyte % 6.6 %      Eosinophil % 1.2 %      Basophil % 0.7 %      Immature Grans % 0.2 %      Neutrophils, Absolute 2.82 10*3/mm3      Lymphocytes, Absolute 0.93 10*3/mm3      Monocytes, Absolute 0.27 10*3/mm3      Eosinophils, Absolute 0.05 10*3/mm3      Basophils, Absolute 0.03 10*3/mm3      Immature Grans, Absolute 0.01 10*3/mm3      nRBC 0.0 /100 WBC     POC Glucose Once [[id number]]  (Abnormal) Collected: 09/20/21 2132    Specimen: Blood Updated: 09/20/21 2134     Glucose >500 mg/dL      Comment: Serial Number: [id number]:  [id number]       CBC & Differential [[id number]]  (Abnormal) Collected: 09/20/21 2138    Specimen: Blood Updated: 09/20/21 2144    Narrative:      The following orders were created for panel order CBC & Differential.  Procedure                               Abnormality         Status                     ---------                               -----------         ------                     CBC Auto Differential[[id number]]        Abnormal            Final result                 Please view results for these tests on the individual orders.    Comprehensive Metabolic Panel [[id number]]  (Abnormal) Collected: 09/20/21 2138    Specimen: Blood Updated:  09/20/21 2219     Glucose 658 mg/dL      BUN 16 mg/dL      Creatinine 0.85 mg/dL      Sodium 133 mmol/L      Potassium 4.4 mmol/L      Chloride 93 mmol/L      CO2 27.6 mmol/L      Calcium 10.2 mg/dL      Total Protein 7.1 g/dL      Albumin 4.20 g/dL      ALT (SGPT) 64 U/L      AST (SGOT) 54 U/L      Alkaline Phosphatase 147 U/L      Total Bilirubin 0.5 mg/dL      eGFR Non African Amer 94 mL/min/1.73      Globulin 2.9 gm/dL      A/G Ratio 1.4 g/dL      BUN/Creatinine Ratio 18.8     Anion Gap 12.4 mmol/L     Narrative:      GFR Normal >60  Chronic Kidney Disease <60  Kidney Failure <15      Ketone Bodies, Serum (Not performed at [facility]) [[id number]]  (Normal) Collected: 09/20/21 2138    Specimen: Blood Updated: 09/20/21 2155    Narrative:      The following orders were created for panel order Ketone Bodies, Serum (Not performed at [facility]).  Procedure                               Abnormality         Status                     ---------                               -----------         ------                     Acetone[[id number]]                      Normal              Final result                 Please view results for these tests on the individual orders.    CBC Auto Differential [[id number]]  (Abnormal) Collected: 09/20/21 2138    Specimen: Blood Updated: 09/20/21 2144     WBC 3.57 10*3/mm3      RBC 4.52 10*6/mm3      Hemoglobin 12.5 g/dL      Hematocrit 37.2 %      MCV 82.3 fL      MCH 27.7 pg      MCHC 33.6 g/dL      RDW 13.8 %      RDW-SD 40.9 fl      MPV 10.1 fL      Platelets 160 10*3/mm3      Neutrophil % 69.4 %      Lymphocyte % 21.3 %      Monocyte % 7.0 %      Eosinophil % 1.4 %      Basophil % 0.6 %      Immature Grans % 0.3 %      Neutrophils, Absolute 2.48 10*3/mm3      Lymphocytes, Absolute 0.76 10*3/mm3      Monocytes, Absolute 0.25 10*3/mm3      Eosinophils, Absolute 0.05 10*3/mm3      Basophils, Absolute 0.02 10*3/mm3      Immature Grans, Absolute 0.01 10*3/mm3      nRBC 0.0 /100 WBC     Acetone  [[id number]]  (Normal) Collected: 09/20/21 2138    Specimen: Blood Updated: 09/20/21 2155     Acetone Negative    Magnesium [[id number]]  (Normal) Collected: 09/20/21 2138    Specimen: Blood Updated: 09/20/21 2305     Magnesium 2.1 mg/dL     Urinalysis With Microscopic If Indicated (No Culture) - [[id number]]  (Abnormal) Collected: 09/20/21 2145    Specimen: Urine Updated: 09/20/21 2200     Color, UA Yellow     Appearance, UA Clear     pH, UA 7.0     Specific Gravity, UA >1.030     Glucose, UA >=1000 mg/dL (3+)     Ketones, UA Negative     Bilirubin, UA Negative     Blood, UA Negative     Protein, UA Negative     Leuk Esterase, UA Negative     Nitrite, UA Negative     Urobilinogen, UA 0.2 E.U./dL    Narrative:      Urine microscopic not indicated.    POC Glucose Once [[id number]]  (Abnormal) Collected: 09/20/21 2307    Specimen: Blood Updated: 09/20/21 2308     Glucose >500 mg/dL      Comment: Serial Number: [id number]:  [id number]              Imaging:  No Radiology Exams Resulted Within Past 24 Hours    Procedures:  Procedures    Progress  ED Course as of Sep 20 2327   Mon Sep 20, 2021   2319 EKG:    Rhythm: Sinus rhythm  Rate: 61  Intervals: Normal WA and QT interval  T-wave: Nonspecific diffuse T wave flattening, probable T wave inversion in V2, V3  ST Segment: No obvious pathological ST elevation or ST depression    EKG Comparison: Unchanged    Interpreted by me      [SD]      ED Course User Index  [SD] Venu Lafleur DO                            Medical Decision Making:  MDM  Number of Diagnoses or Management Options  Hyperglycemia  New onset type 2 diabetes mellitus (CMS/HCC)  Diagnosis management comments:     At the time of admission, the patient appeared well, no distress and nontoxic.  The patient's vital signs are stable.  The patient had mild pseudohyponatremia.  The patient's renal function or other electrolytes appeared within normal limits.  The patient's blood sugar was 660.  There is no evidence  of nonketotic hyperosmolar syndrome or DKA.  The patient was given a liter of saline and 7 units of Humalog.  The patient could not be given large amounts of fluid due to the unknown ejection fraction of the patient's heart and with his past medical history of atrial fibrillation.  The patient will subsequently be admitted to the hospital for slow IV hydration and correction of his hyperglycemia that hopefully switch to oral hypoglycemics.  I discussed this with the on-call hospitalist, Dr. Buckley she was agreeable for admission due to severe hyperglycemia and new onset diabetes       Amount and/or Complexity of Data Reviewed  Clinical lab tests: reviewed and ordered  Tests in the radiology section of CPT®: ordered and reviewed  Tests in the medicine section of CPT®: ordered and reviewed  Discuss the patient with other providers: yes (Discussed with Dr. Buckley she was agreeable for admission)                 Final diagnoses:   Hyperglycemia   New onset type 2 diabetes mellitus (CMS/[facility])        Disposition:  ED Disposition     ED Disposition Condition Comment    Decision to Admit            This medical record created using voice recognition software and may contain unintended errors.    Documentation assistance provided by Caryl Escobar acting as scribe for Venu Lafleur DO. Information recorded by the scribe was done at my direction and has been verified and validated by me.          Caryl Escobar  09/20/21 3951       Venu Lafleur DO  09/21/21 0855

## 2021-09-21 NOTE — PLAN OF CARE
Goal Outcome Evaluation:  Plan of Care Reviewed With: patient        Progress: improving  Outcome Summary: patient transitioned off of insulin drip, started levemir BID and short acting insulin for meals, new on set DM for patient, teaching on diabetes management done by educator, BLAZE, run of afib and elevated HR when laying on left side, patient stated this was normal and happens to him at home, PRN metoprolol for precaution ordered

## 2021-09-21 NOTE — PROGRESS NOTES
[facility]   Hospitalist Progress Note  Date: 2021  Patient Name: Peter Crook  : 1965  MRN: [id number]  Date of admission: 2021      Subjective   Subjective     Chief Complaint: NEW ONSET DIABETES     Summary:   56 y.o. male presents the hospital with hyperglycemia.  The patient presents after being called by Dr. Sykes due to finding abnormal labs particularly hyperglycemia.  The patient has a past medical history of atrial fibrillation, hypertension prostate cancer which is being treated with leuprolide.  On  he noticed he started having increasing fatigue dry mouth, urinary frequency, occasional incontinence and hematuria.  Patient presented to Dr. Sykes who is his local urologist, believing this may be related to his prostate.  Dr. Sykes performed outpatient labs, concerned that there might be other underlying problem such as diabetes contributing to his symptoms.  In fact, labs did reveal significantly elevated blood sugar greater than 500.  The patient was sent to the emergency department where he was again found to be hyperglycemic.  Patient does have a history of atrial fibrillation and was in atrial fibrillation with controlled response.  He was given a dose of insulin in the emergency department but due to his significantly elevated blood sugars we are asked to admit him for further care.    Interval Followup: Patient states he feels fine.  Patient remains on an insulin drip.  Patient is on aware that he has diabetes but is interested in learning more about it and how to give himself shots.  We will be transitioning over to subcutaneous insulin today    Review of Systems  History obtained from the patient  General ROS: Negative for - chills, fever, malaise, or night sweats  Psychological ROS: negative for - anxiety, depression, hallucinations, or mood swings  Ophthalmic ROS: negative for - blurry vision, double vision, or itchy eyes  ENT ROS: negative for - headaches, nasal  congestion, sneezing, or sore throat  Respiratory ROS: negative for - cough, orthopnea, shortness of breath, sputum changes, tachypnea, or wheezing  Cardiovascular ROS: negative for - chest pain, dyspnea on exertion, edema, palpitations, or paroxysmal nocturnal dyspnea  Gastrointestinal ROS: negative for - abdominal pain, constipation, diarrhea, heartburn, hematemesis, or nausea/vomiting  Genito-Urinary ROS: negative for - change in urinary stream, hematuria, incontinence, or urinary frequency/urgency  Musculoskeletal ROS: negative for - joint stiffness, joint swelling, muscle pain, or muscular weakness  Neurological ROS: negative for - confusion, dizziness, gait disturbance, headaches, impaired coordination/balance, memory loss, numbness/tingling, seizures, or visual changes  Dermatological ROS: negative for dry skin and rash       Objective   Objective     Vitals:   Temp:  [97.2 °F (36.2 °C)-98.3 °F (36.8 °C)] 97.2 °F (36.2 °C)  Heart Rate:  [] 67  Resp:  [13-20] 16  BP: (113-151)/() 136/87  Physical Exam    Constitutional: Awake, alert, no acute distress   Eyes: Pupils equal, sclerae anicteric, no conjunctival injection   HENT: NCAT, mucous membranes moist   Neck: Supple, no thyromegaly, no lymphadenopathy, trachea midline   Respiratory: Clear to auscultation bilaterally, nonlabored respirations    Cardiovascular: Irregularly irregular.  Rate controlled, no murmurs,    Gastrointestinal: Positive bowel sounds, soft, nontender, nondistended   Musculoskeletal: No bilateral ankle edema, no clubbing or cyanosis to extremities   Psychiatric: Appropriate affect, cooperative   Neurologic: Oriented x 3, strength symmetric in all extremities, Cranial Nerves grossly intact to confrontation, speech clear   Skin: No rashes     Result Review    Result Review:  I have personally reviewed the results from the time of this admission to 9/21/2021 15:52 EDT and agree with these findings:  [x]  Laboratory  []   Microbiology  [x]  Radiology  []  EKG/Telemetry   []  Cardiology/Vascular   []  Pathology  [x]  Old records  []  Other:    Assessment/Plan   Assessment / Plan     Assessment/Plan:  · HHS resolved  · New onset diabetes mellitus  · Chronic persistent atrial fibrillation on anticoagulation with Eliquis  · Hypertension  · Prostate cancer status post radical prostatectomy and radiation therapy currently on Leuprolide     Plan:  Patient remains on insulin drip at this time will transition over to subcutaneous insulin with Levemir and scheduled Humalog  Patient's hemoglobin A1c is grossly abnormal greater than 12  Diabetic educator to meet with patient today  We will resume patient's other home medication      DVT prophylaxis: Eliquis        Discussed plan with RN.    DVT prophylaxis:  Medical and mechanical DVT prophylaxis orders are present.    CODE STATUS:   Code Status: CPR  Medical Interventions (Level of Support Prior to Arrest): Full        Electronically signed by Anibal Lomax DO, 09/21/21, 3:52 PM EDT.

## 2021-09-21 NOTE — H&P
[facility]  [facility]   Patient Name: Peter Crook  : 1965  MRN: [id number]  Primary Care Physician:  Provider, No Known  Date of admission: 2021    Subjective   Subjective     Chief Complaint: hyperglycemia    HPI:    Peter Crook is a 56 y.o. male presents the hospital with hyperglycemia.  The patient presents after being called by Dr. Sykes due to finding abnormal labs particularly hyperglycemia.  The patient has a past medical history of atrial fibrillation, hypertension prostate cancer which is being treated with leuprolide.  On  he noticed he started having increasing fatigue dry mouth, urinary frequency, occasional incontinence and hematuria.  Patient presented to Dr. Sykes who is his local urologist, believing this may be related to his prostate.  Dr. Sykes performed outpatient labs, concerned that there might be other underlying problem such as diabetes contributing to his symptoms.  In fact, labs did reveal significantly elevated blood sugar greater than 500.  The patient was sent to the emergency department where he was again found to be hyperglycemic.  Patient does have a history of atrial fibrillation and was in atrial fibrillation with controlled response.  He was given a dose of insulin in the emergency department but due to his significantly elevated blood sugars we are asked to admit him for further care.      Personal History     Past Medical History:  Past Medical History:   Diagnosis Date   • Adenocarcinoma of prostate (CMS/HCC)     negetive prostate biopsy 2019    • Anxiety    • Bladder diverticulum    • Depression    • Dyspnea    • ED (erectile dysfunction)    • Hyperlipidemia    • Hypertension    • Spermatocele          Past Surgical History:  Past Surgical History:   Procedure Laterality Date   • APPENDECTOMY     • BLADDER DIVERTICULECTOMY      2020    • HYDROCELECTOMY     • PROSTATECTOMY     • TURP / TRANSURETHRAL  INCISION / DRAINAGE PROSTATE  02/11/2020   • VASECTOMY           Family History:   Family History   Problem Relation Age of Onset   • Cancer Father    • Hypertension Father    • No Known Problems Mother    • No Known Problems Sister    • No Known Problems Brother    • No Known Problems Son    • No Known Problems Daughter    • No Known Problems Maternal Grandmother    • No Known Problems Maternal Grandfather    • No Known Problems Paternal Grandmother    • No Known Problems Paternal Grandfather    • No Known Problems Cousin    • No Known Problems Other    • Rheum arthritis Neg Hx    • Osteoarthritis Neg Hx    • Asthma Neg Hx    • Diabetes Neg Hx    • Heart failure Neg Hx    • Hyperlipidemia Neg Hx    • Migraines Neg Hx    • Rashes / Skin problems Neg Hx    • Seizures Neg Hx    • Stroke Neg Hx    • Thyroid disease Neg Hx          Social History:   Social History     Tobacco Use   • Smoking status: Former Smoker     Packs/day: 2.00     Years: 16.00     Pack years: 32.00     Types: Cigarettes   • Smokeless tobacco: Never Used   Vaping Use   • Vaping Use: Never used   Substance Use Topics   • Alcohol use: Not Currently   • Drug use: Never         Home Medications:  Magnesium, Multiple Vitamins-Minerals, acetaminophen, amLODIPine, apixaban, chlorthalidone, leuprolide, metoprolol tartrate, rosuvastatin, and tadalafil    Allergies:  No Known Allergies    Review of Systems  Review of systems reviewed in its entirety and found to be negative for any changes in the last 2-4 weeks except noted in HPI or above              .    Objective   Objective     Vitals:   Temp:  [98.3 °F (36.8 °C)] 98.3 °F (36.8 °C)  Heart Rate:  [62-71] 62  Resp:  [16] 16  BP: (126-151)/(82-96) 126/82    Physical Exam    Constitutional: Awake, alert, no acute distress   Eyes: Pupils equal, sclerae anicteric, no conjunctival injection   HENT: NCAT, mucous membranes dry   Neck: Supple, no thyromegaly, no lymphadenopathy, trachea midline   Respiratory:  Clear to auscultation bilaterally, nonlabored respirations    Cardiovascular: Irregularly irregular, no murmurs, rubs, or gallops, palpable pedal pulses bilaterally   Gastrointestinal: Positive bowel sounds, soft, nontender, nondistended   Musculoskeletal: No bilateral ankle edema, no clubbing or cyanosis to extremities   Psychiatric: Appropriate affect, cooperative   Neurologic: Oriented x 3, strength symmetric in all extremities, Cranial Nerves grossly intact to confrontation, speech clear   Skin: No rashes     Result Review    Result Review:  I have personally reviewed the results from the time of this admission to 9/20/2021 23:28 EDT and agree with these findings:  [x]  Laboratory  []  Microbiology  [x]  Radiology  [x]  EKG/Telemetry   []  Cardiology/Vascular   []  Pathology  []  Old records  []  Other:      Assessment/Plan   Assessment / Plan     Assessment/Plan:   • HHS  • New onset diabetes mellitus  • Chronic persistent atrial fibrillation  • Hypertension  • Prostate cancer status post radical prostatectomy and radiation therapy currently on Leuprolide    Plan:  Patient is admitted for further care  Continuous cardiac monitoring  Insulin drip  Obtain hemoglobin A1c  Patient will likely need to be transitioned to oral antidiabetic medication in the morning  Diabetes education  Patient may need alternative regimen other than leuprolide for prostate cancer or he may just need more intensive glycemic control while on leuprolide  DVT prophylaxis: Eliquis    Admission Status:  I believe this patient meets inpatient status.    Electronically signed by Rojas Buckley DO, 09/20/21, 11:28 PM EDT.

## 2021-09-21 NOTE — CONSULTS
Provided patient with written material, Diabetes Survival Skills, and taught from exclusively. Provided education on blood glucose monitoring, normal blood glucose values, treatment for hypoglycemia, and treatment for hyperglycemia. Will return tomorrow with a glucometer to demonstrate proper use and insulin administration via insulin pen.

## 2021-09-22 LAB
ANION GAP SERPL CALCULATED.3IONS-SCNC: 8.3 MMOL/L (ref 5–15)
BASOPHILS # BLD AUTO: 0.03 10*3/MM3 (ref 0–0.2)
BASOPHILS NFR BLD AUTO: 0.9 % (ref 0–1.5)
BUN SERPL-MCNC: 16 MG/DL (ref 6–20)
BUN/CREAT SERPL: 18.2 (ref 7–25)
CALCIUM SPEC-SCNC: 9.6 MG/DL (ref 8.6–10.5)
CHLORIDE SERPL-SCNC: 96 MMOL/L (ref 98–107)
CO2 SERPL-SCNC: 28.7 MMOL/L (ref 22–29)
CREAT SERPL-MCNC: 0.88 MG/DL (ref 0.76–1.27)
DEPRECATED RDW RBC AUTO: 40.9 FL (ref 37–54)
EOSINOPHIL # BLD AUTO: 0.07 10*3/MM3 (ref 0–0.4)
EOSINOPHIL NFR BLD AUTO: 2.1 % (ref 0.3–6.2)
ERYTHROCYTE [DISTWIDTH] IN BLOOD BY AUTOMATED COUNT: 13.6 % (ref 12.3–15.4)
GFR SERPL CREATININE-BSD FRML MDRD: 90 ML/MIN/1.73
GLUCOSE BLDC GLUCOMTR-MCNC: 269 MG/DL (ref 70–99)
GLUCOSE BLDC GLUCOMTR-MCNC: 295 MG/DL (ref 70–99)
GLUCOSE BLDC GLUCOMTR-MCNC: 310 MG/DL (ref 70–99)
GLUCOSE SERPL-MCNC: 340 MG/DL (ref 65–99)
HCT VFR BLD AUTO: 37.3 % (ref 37.5–51)
HGB BLD-MCNC: 12.5 G/DL (ref 13–17.7)
IMM GRANULOCYTES # BLD AUTO: 0.01 10*3/MM3 (ref 0–0.05)
IMM GRANULOCYTES NFR BLD AUTO: 0.3 % (ref 0–0.5)
LYMPHOCYTES # BLD AUTO: 0.77 10*3/MM3 (ref 0.7–3.1)
LYMPHOCYTES NFR BLD AUTO: 22.8 % (ref 19.6–45.3)
MAGNESIUM SERPL-MCNC: 2 MG/DL (ref 1.6–2.6)
MCH RBC QN AUTO: 28 PG (ref 26.6–33)
MCHC RBC AUTO-ENTMCNC: 33.5 G/DL (ref 31.5–35.7)
MCV RBC AUTO: 83.4 FL (ref 79–97)
MONOCYTES # BLD AUTO: 0.27 10*3/MM3 (ref 0.1–0.9)
MONOCYTES NFR BLD AUTO: 8 % (ref 5–12)
NEUTROPHILS NFR BLD AUTO: 2.22 10*3/MM3 (ref 1.7–7)
NEUTROPHILS NFR BLD AUTO: 65.9 % (ref 42.7–76)
NRBC BLD AUTO-RTO: 0 /100 WBC (ref 0–0.2)
PLATELET # BLD AUTO: 137 10*3/MM3 (ref 140–450)
PMV BLD AUTO: 10.2 FL (ref 6–12)
POTASSIUM SERPL-SCNC: 3.9 MMOL/L (ref 3.5–5.2)
RBC # BLD AUTO: 4.47 10*6/MM3 (ref 4.14–5.8)
SODIUM SERPL-SCNC: 133 MMOL/L (ref 136–145)
WBC # BLD AUTO: 3.37 10*3/MM3 (ref 3.4–10.8)

## 2021-09-22 PROCEDURE — 83735 ASSAY OF MAGNESIUM: CPT | Performed by: INTERNAL MEDICINE

## 2021-09-22 PROCEDURE — 80048 BASIC METABOLIC PNL TOTAL CA: CPT | Performed by: INTERNAL MEDICINE

## 2021-09-22 PROCEDURE — 82962 GLUCOSE BLOOD TEST: CPT

## 2021-09-22 PROCEDURE — 85025 COMPLETE CBC W/AUTO DIFF WBC: CPT | Performed by: INTERNAL MEDICINE

## 2021-09-22 PROCEDURE — 63710000001 INSULIN DETEMIR PER 5 UNITS: Performed by: INTERNAL MEDICINE

## 2021-09-22 PROCEDURE — 63710000001 INSULIN LISPRO (HUMAN) PER 5 UNITS: Performed by: INTERNAL MEDICINE

## 2021-09-22 PROCEDURE — 99233 SBSQ HOSP IP/OBS HIGH 50: CPT | Performed by: INTERNAL MEDICINE

## 2021-09-22 PROCEDURE — 99254 IP/OBS CNSLTJ NEW/EST MOD 60: CPT | Performed by: INTERNAL MEDICINE

## 2021-09-22 RX ORDER — NICOTINE POLACRILEX 4 MG
15 LOZENGE BUCCAL
Status: DISCONTINUED | OUTPATIENT
Start: 2021-09-22 | End: 2021-09-23 | Stop reason: HOSPADM

## 2021-09-22 RX ORDER — DEXTROSE MONOHYDRATE 100 MG/ML
25 INJECTION, SOLUTION INTRAVENOUS
Status: DISCONTINUED | OUTPATIENT
Start: 2021-09-22 | End: 2021-09-23 | Stop reason: HOSPADM

## 2021-09-22 RX ADMIN — INSULIN LISPRO 8 UNITS: 100 INJECTION, SOLUTION INTRAVENOUS; SUBCUTANEOUS at 09:34

## 2021-09-22 RX ADMIN — VALSARTAN 320 MG: 80 TABLET, FILM COATED ORAL at 09:34

## 2021-09-22 RX ADMIN — INSULIN LISPRO 8 UNITS: 100 INJECTION, SOLUTION INTRAVENOUS; SUBCUTANEOUS at 12:52

## 2021-09-22 RX ADMIN — INSULIN LISPRO 12 UNITS: 100 INJECTION, SOLUTION INTRAVENOUS; SUBCUTANEOUS at 18:11

## 2021-09-22 RX ADMIN — SODIUM CHLORIDE, PRESERVATIVE FREE 10 ML: 5 INJECTION INTRAVENOUS at 20:02

## 2021-09-22 RX ADMIN — INSULIN DETEMIR 20 UNITS: 100 INJECTION, SOLUTION SUBCUTANEOUS at 09:35

## 2021-09-22 RX ADMIN — INSULIN LISPRO 4 UNITS: 100 INJECTION, SOLUTION INTRAVENOUS; SUBCUTANEOUS at 18:12

## 2021-09-22 RX ADMIN — APIXABAN 5 MG: 5 TABLET, FILM COATED ORAL at 09:34

## 2021-09-22 RX ADMIN — CHLORTHALIDONE 25 MG: 25 TABLET ORAL at 09:34

## 2021-09-22 RX ADMIN — ROSUVASTATIN 20 MG: 20 TABLET, FILM COATED ORAL at 20:01

## 2021-09-22 RX ADMIN — AMLODIPINE BESYLATE 10 MG: 5 TABLET ORAL at 09:34

## 2021-09-22 RX ADMIN — APIXABAN 5 MG: 5 TABLET, FILM COATED ORAL at 20:01

## 2021-09-22 RX ADMIN — SODIUM CHLORIDE, PRESERVATIVE FREE 10 ML: 5 INJECTION INTRAVENOUS at 09:38

## 2021-09-22 RX ADMIN — METOPROLOL SUCCINATE 100 MG: 50 TABLET, EXTENDED RELEASE ORAL at 09:34

## 2021-09-22 RX ADMIN — SODIUM CHLORIDE, PRESERVATIVE FREE 3 ML: 5 INJECTION INTRAVENOUS at 20:02

## 2021-09-22 RX ADMIN — INSULIN DETEMIR 20 UNITS: 100 INJECTION, SOLUTION SUBCUTANEOUS at 20:00

## 2021-09-22 NOTE — PLAN OF CARE
Goal Outcome Evaluation:  Plan of Care Reviewed With: patient        Progress: improving  Outcome Summary: Pt stable; no c/o pain or nausea; anticipates DC soon. Will CTM

## 2021-09-22 NOTE — PLAN OF CARE
Goal Outcome Evaluation:  Plan of Care Reviewed With: patient        Progress: improving  Outcome Summary: VSS, administering insulin shots self with RN, patient education from diabetes educator, has own monitor to practice

## 2021-09-22 NOTE — PROGRESS NOTES
[facility]   Hospitalist Progress Note  Date: 2021  Patient Name: Peter Crook  : 1965  MRN: [id number]  Date of admission: 2021      Subjective   Subjective     Chief Complaint: NEW ONSET DIABETES     Summary:   56 y.o. male presents the hospital with hyperglycemia.  The patient presents after being called by Dr. Sykes due to finding abnormal labs particularly hyperglycemia.  The patient has a past medical history of atrial fibrillation, hypertension prostate cancer which is being treated with leuprolide.  On  he noticed he started having increasing fatigue dry mouth, urinary frequency, occasional incontinence and hematuria.  Patient presented to Dr. Sykes who is his local urologist, believing this may be related to his prostate.  Dr. Sykes performed outpatient labs, concerned that there might be other underlying problem such as diabetes contributing to his symptoms.  In fact, labs did reveal significantly elevated blood sugar greater than 500.  The patient was sent to the emergency department where he was again found to be hyperglycemic.  Patient does have a history of atrial fibrillation and was in atrial fibrillation with controlled response.  He was given a dose of insulin in the emergency department but due to his significantly elevated blood sugars we are asked to admit him for further care.    Interval Followup  Patient has been transitioned to subcutaneous insulin however sugars are running between 300-400 despite being on significant dose of insulin.  At this time will need to increase it further.  Patient has received diabetes education and feels confident with injections and checking his blood sugar.  Patient also has atrial fibrillation at times his rate is fast he remains on his home medication as well as his Eliquis.  Patient does request a cardiology consult since he does not have a cardiologist in the area.  Patient also comments on when he lays on his side he seems to  go into a faster rate.    Review of Systems  History obtained from the patient  General ROS: Negative for - chills, fever, malaise, or night sweats  Psychological ROS: negative for - anxiety, depression, hallucinations, or mood swings  Ophthalmic ROS: negative for - blurry vision, double vision, or itchy eyes  ENT ROS: negative for - headaches, nasal congestion, sneezing, or sore throat  Respiratory ROS: negative for - cough, orthopnea, shortness of breath, sputum changes, tachypnea, or wheezing  Cardiovascular ROS: negative for - chest pain, dyspnea on exertion, edema, palpitations, or paroxysmal nocturnal dyspnea  Gastrointestinal ROS: negative for - abdominal pain, constipation, diarrhea, heartburn, hematemesis, or nausea/vomiting  Genito-Urinary ROS: negative for - change in urinary stream, hematuria, incontinence, or urinary frequency/urgency  Musculoskeletal ROS: negative for - joint stiffness, joint swelling, muscle pain, or muscular weakness  Neurological ROS: negative for - confusion, dizziness, gait disturbance, headaches, impaired coordination/balance, memory loss, numbness/tingling, seizures, or visual changes  Dermatological ROS: negative for dry skin and rash       Objective   Objective     Vitals:   Temp:  [98 °F (36.7 °C)-98.7 °F (37.1 °C)] 98.1 °F (36.7 °C)  Heart Rate:  [54-84] 77  Resp:  [16-20] 18  BP: (117-141)/(76-95) 141/95  Physical Exam    Constitutional: Awake, alert, no acute distress   Eyes: Pupils equal, sclerae anicteric, no conjunctival injection   HENT: NCAT, mucous membranes moist   Neck: Supple, no thyromegaly, no lymphadenopathy, trachea midline   Respiratory: Clear to auscultation bilaterally, nonlabored respirations    Cardiovascular: Irregularly irregular.  Rate controlled, no murmurs,    Gastrointestinal: Positive bowel sounds, soft, nontender, nondistended   Musculoskeletal: No bilateral ankle edema, no clubbing or cyanosis to extremities   Psychiatric: Appropriate affect,  cooperative   Neurologic: Oriented x 3, strength symmetric in all extremities, Cranial Nerves grossly intact to confrontation, speech clear   Skin: No rashes     Result Review    Result Review:  I have personally reviewed the results from the time of this admission to 9/22/2021 16:43 EDT and agree with these findings:  [x]  Laboratory  []  Microbiology  [x]  Radiology  []  EKG/Telemetry   []  Cardiology/Vascular   []  Pathology  [x]  Old records  []  Other:    Assessment/Plan   Assessment / Plan     Assessment/Plan:  · HHS resolved  · New onset diabetes mellitus  · Chronic persistent atrial fibrillation on anticoagulation with Eliquis  · Hypertension  · Prostate cancer status post radical prostatectomy and radiation therapy currently on Leuprolide     Plan:  Continue Levemir at 20 units twice daily.  Will increase scheduled Humalog to 12 units 3 times daily with meals.  We will continue sliding scale insulin  Resume patient's home metoprolol, Cardizem and Eliquis.  We will also consult cardiology  Patient's hemoglobin A1c is grossly abnormal greater than 12  Patient has received diabetes education         DVT prophylaxis: Eliquis        Discussed plan with RN.    DVT prophylaxis:  Medical and mechanical DVT prophylaxis orders are present.    CODE STATUS:   Code Status: CPR  Medical Interventions (Level of Support Prior to Arrest): Full

## 2021-09-22 NOTE — CONSULTS
[facility]   Cardiology Consult Note    Patient Name: Peter Crook  : 1965  MRN: [id number]  Primary Care Physician:  Jennifer Mckeon DO  Referring Physician: No ref. provider found  Date of admission: 2021    Subjective   Subjective     Reason for Consult/ Chief Complaint: Palpitations    HPI:  Peter Crook is a 56 y.o. male with history of paroxysmal atrial fibrillation, hypertension, and hyperlipidemia who presented to the hospital with symptoms of fatigue, dry mouth, increased urinations.  He was found to have a glucose of 680.  He denied any chest pain, shortness of breath, fever, or chills.  He does note that he has been having episodes of atrial fibrillation very frequently.  He states it almost occurs every day.  He does note when he goes into atrial fibrillation and when the A. fib stops.  He is on metoprolol succinate 200 daily along with Eliquis.  He does note some hematuria as he had a prostate cancer surgery.    Review of Systems   All systems were reviewed and negative except for: Palpitations, dry mouth, fatigue, and increased urinations.    Personal History     Past Medical History:   Diagnosis Date   • Adenocarcinoma of prostate (CMS/HCC)     negetive prostate biopsy 2019    • Anxiety    • Bladder diverticulum    • Depression    • Dyspnea    • ED (erectile dysfunction)    • Hyperlipidemia    • Hypertension    • Spermatocele         Past medical history reviewed.  Also positive for paroxysmal atrial fibrillation.  Also positive now for diabetes.      Family History: family history includes Cancer in his father; Hypertension in his father; No Known Problems in his brother, cousin, daughter, maternal grandfather, maternal grandmother, mother, paternal grandfather, paternal grandmother, sister, son, and another family member. Otherwise pertinent FHx was reviewed and not pertinent to current issue.    Social History:  reports that he has quit smoking. His smoking use included  cigarettes. He has a 32.00 pack-year smoking history. He has never used smokeless tobacco. He reports previous alcohol use. He reports that he does not use drugs.    Home Medications:  Magnesium, Multiple Vitamins-Minerals, acetaminophen, amLODIPine, apixaban, chlorthalidone, leuprolide, metoprolol succinate XL, rosuvastatin, and valsartan    Allergies:  No Known Allergies    Objective    Objective     Vitals:   Temp:  [98 °F (36.7 °C)-98.7 °F (37.1 °C)] 98.1 °F (36.7 °C)  Heart Rate:  [54-84] 77  Resp:  [16-20] 18  BP: (117-141)/(76-95) 141/95      Physical Exam:   Constitutional: Awake, alert, No acute distress    Eyes: PERRLA, sclerae anicteric, no conjunctival injection   HENT: NCAT, mucous membranes moist   Neck: Supple, no thyromegaly, no lymphadenopathy, trachea midline   Respiratory: Clear to auscultation bilaterally, nonlabored respirations    Cardiovascular: RRR, no murmurs, rubs, or gallops, palpable pedal pulses bilaterally   Gastrointestinal: Positive bowel sounds, soft, nontender, nondistended   Musculoskeletal: No bilateral ankle edema, no clubbing or cyanosis to extremities   Psychiatric: Appropriate affect, cooperative   Neurologic: Oriented x 3, strength symmetric in all extremities, Cranial Nerves grossly intact to confrontation, speech clear   Skin: No rashes     Result Review    Result Review:  I have personally reviewed the results from the time of this admission to 9/22/2021 19:25 EDT and agree with these findings:  [x]  Laboratory  []  Microbiology  [x]  Radiology  [x]  EKG/Telemetry   [x]  Cardiology/Vascular   []  Pathology  [x]  Old records  []  Other:  Most notable findings include:     CMP    CMP 9/20/21 9/20/21 9/21/21 9/22/21    0905 2138     Glucose 597 (A) 658 (A) 307 (A) 340 (A)   BUN 15 16 14 16   Creatinine 0.99 0.85 0.79 0.88   eGFR Non  Am 78 94 102 90   Sodium 131 (A) 133 (A) 134 (A) 133 (A)   Potassium 4.8 4.4 3.7 3.9   Chloride 91 (A) 93 (A) 96 (A) 96 (A)   Calcium 9.7  10.2 9.7 9.6   Albumin 4.30 4.20 4.20    Total Bilirubin 0.6 0.5 0.5    Alkaline Phosphatase 129 (A) 147 (A) 114    AST (SGOT) 56 (A) 54 (A) 57 (A)    ALT (SGPT) 69 (A) 64 (A) 61 (A)    (A) Abnormal value             CBC    CBC 9/20/21 9/20/21 9/21/21 9/22/21    0905 2138     WBC 4.11 3.57 3.71 3.37 (A)   RBC 4.63 4.52 4.58 4.47   Hemoglobin 12.8 (A) 12.5 (A) 12.7 (A) 12.5 (A)   Hematocrit 39.6 37.2 (A) 37.3 (A) 37.3 (A)   MCV 85.5 82.3 81.4 83.4   MCH 27.6 27.7 27.7 28.0   MCHC 32.3 33.6 34.0 33.5   RDW 13.7 13.8 13.8 13.6   Platelets 159 160 176 137 (A)   (A) Abnormal value             No results found for: TROPONINT      Assessment/Plan   Assessment / Plan     Brief Patient Summary:  Peter Crook is a 56 y.o. male who has been diagnosed with diabetes since being here.  He has a history of hypertension and hyperlipidemia.  He was diagnosed with atrial fibrillation in the past and placed on metoprolol and Eliquis.  He states that he is having an episode almost every evening of palpitations.  It is usually more when he is laying on his left side.  He did have an episode on telemetry while he was here and even with the metoprolol his heart races.    Active Hospital Problems:  Active Hospital Problems    Diagnosis    • Hyperglycemia        Assessment:  1.  New onset diabetes  2.  Paroxysmal atrial fibrillation diagnosed in the past  3.  Hypertension    Plan:   1.  Blood pressure is under good control  2.  Patient is having episodes of atrial fibrillation and the Toprol is not slowing it down.  Some of it even looks like it could be consistent with a flutter morphology.  3.  Would set the patient up to see us in 1 to 2 weeks in clinic.  He is going to be staying here at least for another 6+ months.  When he sees us in clinic we can probably give him propafenone or flecainide to either use daily or as needed until we can get him to see an electrophysiologist.  I do think he would be a good candidate for atrial  fibrillation ablation.  4.  We will set up an echocardiogram here.  If there are no significant abnormalities I do think he could be discharged from a cardiac standpoint and we can try to get most of this done as an outpatient.    Electronically signed by Venu Snow MD, 09/22/21, 7:25 PM EDT.

## 2021-09-22 NOTE — SIGNIFICANT NOTE
09/22/21 1030   Plan   Plan Patient reports living in area for work, approximately for 1 year.  Home state in [place].  New diagnosis of DM.  Patient has been educated by ELENI VENCES.  Patient is agreeable for PCP set up in [facility].  Appt with Dr. Mckeon for Monday Sept, 27.  Patient plans to return home with no other needs at this time.

## 2021-09-22 NOTE — CONSULTS
Provided patient with a OneTouch Verio Reflect glucometer. Provided education and demonstration on proper use of glucometer. Patient adequately returned demonstration of proper use.    Provided education and demonstration of self-injection of insulin via insulin pen. Patient returned demonstration without queuing.    Patient with no further questions at this time.    Recommend on Discharge:    Levemir FlexTouch at lowest out of pocket cost to patient    Humalog KwikPen at lowest out of pocket cost to patient    RX for OneTouch Verio test strips

## 2021-09-23 ENCOUNTER — READMISSION MANAGEMENT (OUTPATIENT)
Dept: CALL CENTER | Facility: HOSPITAL | Age: 56
End: 2021-09-23

## 2021-09-23 VITALS
TEMPERATURE: 97.3 F | DIASTOLIC BLOOD PRESSURE: 73 MMHG | WEIGHT: 242.29 LBS | RESPIRATION RATE: 16 BRPM | OXYGEN SATURATION: 97 % | HEIGHT: 76 IN | BODY MASS INDEX: 29.5 KG/M2 | SYSTOLIC BLOOD PRESSURE: 120 MMHG | HEART RATE: 82 BPM

## 2021-09-23 LAB
GLUCOSE BLDC GLUCOMTR-MCNC: 253 MG/DL (ref 70–99)
GLUCOSE BLDC GLUCOMTR-MCNC: 262 MG/DL (ref 70–99)
GLUCOSE BLDC GLUCOMTR-MCNC: 294 MG/DL (ref 70–99)

## 2021-09-23 PROCEDURE — 63710000001 INSULIN LISPRO (HUMAN) PER 5 UNITS: Performed by: INTERNAL MEDICINE

## 2021-09-23 PROCEDURE — 63710000001 INSULIN DETEMIR PER 5 UNITS: Performed by: INTERNAL MEDICINE

## 2021-09-23 PROCEDURE — 82962 GLUCOSE BLOOD TEST: CPT

## 2021-09-23 PROCEDURE — 99239 HOSP IP/OBS DSCHRG MGMT >30: CPT | Performed by: INTERNAL MEDICINE

## 2021-09-23 RX ORDER — BLOOD-GLUCOSE METER
1 KIT MISCELLANEOUS
Qty: 1 EACH | Refills: 0 | Status: SHIPPED | OUTPATIENT
Start: 2021-09-23 | End: 2021-10-23

## 2021-09-23 RX ORDER — LANCETS 33 GAUGE
1 EACH MISCELLANEOUS
Qty: 90 EACH | Refills: 0 | Status: SHIPPED | OUTPATIENT
Start: 2021-09-23 | End: 2021-10-23

## 2021-09-23 RX ORDER — INSULIN DETEMIR 100 [IU]/ML
25 INJECTION, SOLUTION SUBCUTANEOUS 2 TIMES DAILY
Qty: 1 PEN | Refills: 1 | Status: SHIPPED | OUTPATIENT
Start: 2021-09-23 | End: 2021-11-15 | Stop reason: SDUPTHER

## 2021-09-23 RX ORDER — INSULIN LISPRO 200 [IU]/ML
15 INJECTION, SOLUTION SUBCUTANEOUS
Qty: 6.75 ML | Refills: 1 | Status: SHIPPED | OUTPATIENT
Start: 2021-09-23 | End: 2021-11-15 | Stop reason: SDUPTHER

## 2021-09-23 RX ORDER — PEN NEEDLE, DIABETIC 30 GX3/16"
1 NEEDLE, DISPOSABLE MISCELLANEOUS
Qty: 100 EACH | Refills: 1 | Status: SHIPPED | OUTPATIENT
Start: 2021-09-23

## 2021-09-23 RX ADMIN — INSULIN LISPRO 12 UNITS: 100 INJECTION, SOLUTION INTRAVENOUS; SUBCUTANEOUS at 09:11

## 2021-09-23 RX ADMIN — METOPROLOL SUCCINATE 100 MG: 50 TABLET, EXTENDED RELEASE ORAL at 09:12

## 2021-09-23 RX ADMIN — INSULIN LISPRO 12 UNITS: 100 INJECTION, SOLUTION INTRAVENOUS; SUBCUTANEOUS at 12:27

## 2021-09-23 RX ADMIN — INSULIN DETEMIR 20 UNITS: 100 INJECTION, SOLUTION SUBCUTANEOUS at 09:09

## 2021-09-23 RX ADMIN — INSULIN LISPRO 4 UNITS: 100 INJECTION, SOLUTION INTRAVENOUS; SUBCUTANEOUS at 12:26

## 2021-09-23 RX ADMIN — INSULIN LISPRO 4 UNITS: 100 INJECTION, SOLUTION INTRAVENOUS; SUBCUTANEOUS at 09:11

## 2021-09-23 RX ADMIN — CHLORTHALIDONE 25 MG: 25 TABLET ORAL at 09:12

## 2021-09-23 RX ADMIN — INSULIN LISPRO 4 UNITS: 100 INJECTION, SOLUTION INTRAVENOUS; SUBCUTANEOUS at 18:00

## 2021-09-23 RX ADMIN — APIXABAN 5 MG: 5 TABLET, FILM COATED ORAL at 09:13

## 2021-09-23 RX ADMIN — VALSARTAN 320 MG: 80 TABLET, FILM COATED ORAL at 09:13

## 2021-09-23 RX ADMIN — SODIUM CHLORIDE, PRESERVATIVE FREE 3 ML: 5 INJECTION INTRAVENOUS at 09:14

## 2021-09-23 RX ADMIN — AMLODIPINE BESYLATE 10 MG: 5 TABLET ORAL at 09:12

## 2021-09-23 RX ADMIN — SODIUM CHLORIDE, PRESERVATIVE FREE 10 ML: 5 INJECTION INTRAVENOUS at 09:14

## 2021-09-23 RX ADMIN — INSULIN LISPRO 15 UNITS: 100 INJECTION, SOLUTION INTRAVENOUS; SUBCUTANEOUS at 18:01

## 2021-09-23 NOTE — PLAN OF CARE
Goal Outcome Evaluation:  Plan of Care Reviewed With: patient        Progress: improving    Patient is being discharged home

## 2021-09-23 NOTE — PLAN OF CARE
Goal Outcome Evaluation:  Plan of Care Reviewed With: patient        Progress: improving  Outcome Summary: VSS; pt practiced administering insulin to self w/o complications; no c/o pain; anticipates DC today

## 2021-09-23 NOTE — OUTREACH NOTE
Prep Survey      Responses   [facility] patient discharged from?  [facility]   Is LACE score < 7 ?  No   Emergency Room discharge w/ pulse ox?  No   Eligibility  [facility]   Date of Admission  09/20/21   Date of Discharge  09/23/21   Discharge Disposition  Home or Self Care   Discharge diagnosis  Hyperglycemia   Does the patient have one of the following disease processes/diagnoses(primary or secondary)?  Other   Does the patient have Home health ordered?  No   Is there a DME ordered?  No   Prep survey completed?  Yes          Regina Herrera RN

## 2021-09-23 NOTE — DISCHARGE SUMMARY
[facility]         HOSPITALIST  DISCHARGE SUMMARY    Patient Name: Peter Crook  : 1965  MRN: [id number]    Date of Admission: 2021  Date of Discharge:  2021    Primary Care Physician: Jennifer Mckeon DO    Consults     Date and Time Order Name Status Description    2021  4:40 PM Inpatient Cardiology Consult Completed     2021 10:56 PM Inpatient Hospitalist Consult Completed           Active and Resolved Hospital Problems:  Active Hospital Problems    Diagnosis POA   • Hyperglycemia [R73.9] Yes      Resolved Hospital Problems   No resolved problems to display.       Hospital Course     Hospital Course:  Peter Crook is a 56 y.o. male presents the hospital with hyperglycemia.  The patient presents after being called by Dr. Sykes due to finding abnormal labs particularly hyperglycemia.  The patient has a past medical history of atrial fibrillation, hypertension prostate cancer which is being treated with leuprolide.  On  he noticed he started having increasing fatigue dry mouth, urinary frequency, occasional incontinence and hematuria.  Patient presented to Dr. Sykes who is his local urologist, believing this may be related to his prostate.  Dr. Sykes performed outpatient labs, concerned that there might be other underlying problem such as diabetes contributing to his symptoms.  In fact, labs did reveal significantly elevated blood sugar greater than 500.  The patient was sent to the emergency department where he was again found to be hyperglycemic.  Patient does have a history of atrial fibrillation and was in atrial fibrillation with controlled response.  He was given a dose of insulin in the emergency department but due to his significantly elevated blood sugars we are asked to admit him for further care.    Patient has required significant amount of insulin to control his sugar.  His sugars are much better controlled in the 200-300 range.  Patient is anxious to  be discharged home.  Patient did receive diabetic education during his hospitalization.  At this time patient does have an appointment with a primary care provider on Monday.  Discussed with patient that his insulin may change and he needs close follow-up with primary care to adjust those doses.  Patient states that he feels significantly better since admission.  Patient was also seen by cardiology for his atrial fibrillation with RVR and need to establish a cardiologist here in town.  Cardiology feels that he would benefit from an ablation and can refer him as an outpatient for that.  Patient was maintained on his cardiac medication.  At this time patient will be discharged home in stable condition.  He will be discharged on insulin and he does have a glucose meter and all of the testing supplies.  Patient discharged in stable condition        Day of Discharge     Vital Signs:  Temp:  [97.2 °F (36.2 °C)-98.1 °F (36.7 °C)] 97.3 °F (36.3 °C)  Heart Rate:  [60-82] 82  Resp:  [16-18] 16  BP: (106-136)/(61-90) 120/73  Physical Exam:               Constitutional: Awake, alert, no acute distress              Eyes: Pupils equal, sclerae anicteric, no conjunctival injection              HENT: NCAT, mucous membranes moist              Neck: Supple,              Respiratory: Clear to auscultation bilaterally, nonlabored respirations               Cardiovascular: Irregularly irregular.  Rate controlled, no murmurs,               Gastrointestinal: Positive bowel sounds, soft, nontender, nondistended              Musculoskeletal: No bilateral ankle edema, no clubbing or cyanosis to extremities              Psychiatric: Appropriate affect, cooperative              Neurologic: Oriented x 3, strength symmetric in all extremities, Cranial Nerves grossly intact to confrontation, speech clear              Skin: No rashes       Discharge Details        Discharge Medications      New Medications      Instructions Start Date   glucose  blood test strip  Commonly known as: ONE TOUCH ULTRA TEST   1 each, Other, 3 Times Daily With Meals, Use as instructed      HumaLOG KwikPen 200 UNIT/ML solution pen-injector  Generic drug: Insulin Lispro   15 Units, Subcutaneous, 3 Times Daily With Meals, Disp qs for 1 month supply      Levemir FlexTouch 100 UNIT/ML injection  Generic drug: insulin detemir   25 Units, Subcutaneous, 2 Times Daily, Disp qs for 1 month supply      ONE TOUCH ULTRA MINI w/Device kit   1 each, Other, Daily With Breakfast, Lunch & Dinner      OneTouch Delica Lancets 33G misc   1 each, Other, Daily With Breakfast, Lunch & Dinner      Pen Needles 31G X 5 MM misc   1 each, Subcutaneous, Daily With Breakfast, Lunch & Dinner         Continue These Medications      Instructions Start Date   acetaminophen 325 MG tablet  Commonly known as: TYLENOL   650 mg, Oral, Every 6 Hours PRN      amLODIPine 10 MG tablet  Commonly known as: NORVASC   10 mg, Oral, Daily      apixaban 5 MG tablet tablet  Commonly known as: ELIQUIS   5 mg, Oral, 2 Times Daily      chlorthalidone 25 MG tablet  Commonly known as: HYGROTON   25 mg, Oral, Daily      EMERGEN-C VITAMIN C PO   1 tablet, Oral, Daily      leuprolide 22.5 MG injection  Commonly known as: LUPRON   22.5 mg, Intramuscular, Every 3 Months      Magnesium 400 MG tablet   400 mg, Oral, Daily      rosuvastatin 20 MG tablet  Commonly known as: CRESTOR   20 mg, Oral, Daily      Toprol  MG 24 hr tablet  Generic drug: metoprolol succinate XL   100 mg, Oral, 2 times daily      valsartan 320 MG tablet  Commonly known as: DIOVAN   320 mg, Oral, Daily             No Known Allergies    Discharge Disposition:  Home or Self Care    Diet:  Hospital:  Diet Order   Procedures   • Diet Regular; Consistent Carbohydrate       Discharge Activity: as tolerated       CODE STATUS:  Code Status and Medical Interventions:   Ordered at: 09/20/21 8106     Code Status:    CPR     Medical Interventions (Level of Support Prior to  Arrest):    Full         Future Appointments   Date Time Provider Department Center   9/24/2021  3:00 PM Filiberto Sykes MD [facility]   9/27/2021  9:30 AM Jennifer Mckeon DO [facility]       Additional Instructions for the Follow-ups that You Need to Schedule     Discharge Follow-up with PCP   As directed       Currently Documented PCP:    Jennifer Mckeon DO    PCP Phone Number:    [phone]     Follow Up Details: as scheduled               Pertinent  and/or Most Recent Results     PROCEDURES:   None     LAB RESULTS:      Lab 09/22/21 0442 09/21/21 0618 09/20/21 2138 09/20/21  0905   WBC 3.37* 3.71 3.57 4.11   HEMOGLOBIN 12.5* 12.7* 12.5* 12.8*   HEMATOCRIT 37.3* 37.3* 37.2* 39.6   PLATELETS 137* 176 160 159   NEUTROS ABS 2.22 2.62 2.48 2.82   IMMATURE GRANS (ABS) 0.01 0.01 0.01 0.01   LYMPHS ABS 0.77 0.75 0.76 0.93   MONOS ABS 0.27 0.23 0.25 0.27   EOS ABS 0.07 0.07 0.05 0.05   MCV 83.4 81.4 82.3 85.5         Lab 09/22/21 0442 09/21/21 0824 09/20/21 2226 09/20/21 2138 09/20/21  0905   SODIUM 133* 134*  --  133* 131*   POTASSIUM 3.9 3.7  --  4.4 4.8   CHLORIDE 96* 96*  --  93* 91*   CO2 28.7 29.6*  --  27.6 27.1   ANION GAP 8.3 8.4  --  12.4 12.9   BUN 16 14  --  16 15   CREATININE 0.88 0.79  --  0.85 0.99   GLUCOSE 340* 307*  --  658* 597*   CALCIUM 9.6 9.7  --  10.2 9.7   MAGNESIUM 2.0  --   --  2.1  --    HEMOGLOBIN A1C  --   --  12.20*  --   --          Lab 09/21/21 0824 09/20/21 2138 09/20/21  0905   TOTAL PROTEIN 6.7 7.1 7.1   ALBUMIN 4.20 4.20 4.30   GLOBULIN 2.5 2.9 2.8   ALT (SGPT) 61* 64* 69*   AST (SGOT) 57* 54* 56*   BILIRUBIN 0.5 0.5 0.6   ALK PHOS 114 147* 129*                     Brief Urine Lab Results  (Last result in the past 365 days)      Color   Clarity   Blood   Leuk Est   Nitrite   Protein   CREAT   Urine HCG        09/20/21 2145 Yellow Clear Negative Negative Negative Negative             Microbiology Results (last 10 days)     ** No results found for the last  240 hours. **                               Time spent on Discharge including face to face service:  More than 35  minutes    Electronically signed by Anibal Lomax DO, 09/23/21, 7:10 PM EDT.

## 2021-09-24 ENCOUNTER — OFFICE VISIT (OUTPATIENT)
Dept: UROLOGY | Facility: CLINIC | Age: 56
End: 2021-09-24

## 2021-09-24 ENCOUNTER — TRANSITIONAL CARE MANAGEMENT TELEPHONE ENCOUNTER (OUTPATIENT)
Dept: CALL CENTER | Facility: HOSPITAL | Age: 56
End: 2021-09-24

## 2021-09-24 DIAGNOSIS — N30.40 RADIATION CYSTITIS: ICD-10-CM

## 2021-09-24 DIAGNOSIS — C61 PROSTATE CANCER (HCC): Primary | ICD-10-CM

## 2021-09-24 PROCEDURE — 52000 CYSTOURETHROSCOPY: CPT | Performed by: UROLOGY

## 2021-09-24 NOTE — OUTREACH NOTE
Call Center TCM Note      Responses   [facility] patient discharged from?  [facility]   Does the patient have one of the following disease processes/diagnoses(primary or secondary)?  Other   TCM attempt successful?  Yes   Call start time  1257   Call end time  1306   Discharge diagnosis  Hyperglycemia   Meds reviewed with patient/caregiver?  Yes   Is the patient having any side effects they believe may be caused by any medication additions or changes?  No   Does the patient have all medications ordered at discharge?  Yes   Is the patient taking all medications as directed (includes completed medication regime)?  Yes   Medication comments  Patient reports he has to go back to pharmacy today to  a few things    Comments regarding appointments  [facility] to call and schedule appt r/t Afib   Does the patient have a primary care provider?   Yes   Does the patient have an appointment with their PCP within 7 days of discharge?  Yes   Comments regarding PCP  New patient/Hospital PCP FOLLOW UP APPOINTMENT IS 9/27/21@0930am   Has the patient kept scheduled appointments due by today?  N/A   Has home health visited the patient within 72 hours of discharge?  N/A   Psychosocial issues?  No   Did the patient receive a copy of their discharge instructions?  Yes   Nursing interventions  Reviewed instructions with patient   What is the patient's perception of their health status since discharge?  Improving [Pt reports his BG on admission was 680--he BG since d/c has been 220-300 so much improvement since d/c.  He communicates he received great education from diabetes nurse  and had no questions at time of call. ]   Is the patient/caregiver able to teach back signs and symptoms related to disease process for when to call PCP?  Yes   Is the patient/caregiver able to teach back signs and symptoms related to disease process for when to call 911?  Yes   Additional teach back comments  Reports Afib is ongoing  issue for him-worse at night but improves during the day.    TCM call completed?  Yes          Maria Luisa Jarrett RN    9/24/2021, 13:06 EDT

## 2021-09-24 NOTE — PROGRESS NOTES
Cystoscopy    Date/Time: 9/24/2021 3:42 PM  Performed by: Filiberto Sykes MD  Authorized by: Filiberto Sykes MD   Preparation: Patient was prepped and draped in the usual sterile fashion.  Local anesthesia used: yes    Anesthesia:  Local anesthesia used: yes  Local Anesthetic: topical anesthetic  Anesthetic total: 12 mL    Sedation:  Patient sedated: no        Patient has prostate carcinoma status post radical prostatectomy and radiation therapy    Patient was placed in lithotomy position.  Thorough scrubbing her lower abdomen external genitalia was performed with Hibiclens.  18 Olympus flexible cystoscope was inserted into the urethra which was normal.  Patient has had radical prostatectomy but at the bladder neck at 1 o'clock position he has some regrowth which looks like prostatic tissue.  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  Except radiation cystitis I do not see any tumor except the area on the right bladder wall.  Both ureteral orifices are normal.There was no evidence of vesicocolic fistula.  Flexible cystoscope was removed and patient tolerated the procedure very well .    Do not like this area at the bladder neck anteriorly and the area right bladder wall due to biopsies of these areas to make sure were not dealing with recurrent carcinoma.  I have alerted him that it might increase his urinary incontinence and it is acceptable to the patient.

## 2021-09-27 ENCOUNTER — OFFICE VISIT (OUTPATIENT)
Dept: FAMILY MEDICINE CLINIC | Facility: CLINIC | Age: 56
End: 2021-09-27

## 2021-09-27 VITALS
TEMPERATURE: 97.1 F | OXYGEN SATURATION: 98 % | SYSTOLIC BLOOD PRESSURE: 110 MMHG | DIASTOLIC BLOOD PRESSURE: 80 MMHG | WEIGHT: 243 LBS | HEART RATE: 83 BPM | BODY MASS INDEX: 29.59 KG/M2 | HEIGHT: 76 IN

## 2021-09-27 DIAGNOSIS — E78.5 DYSLIPIDEMIA: ICD-10-CM

## 2021-09-27 DIAGNOSIS — Z11.59 ENCOUNTER FOR HEPATITIS C SCREENING TEST FOR LOW RISK PATIENT: ICD-10-CM

## 2021-09-27 DIAGNOSIS — E11.65 UNCONTROLLED TYPE 2 DIABETES MELLITUS WITH HYPERGLYCEMIA (HCC): Primary | ICD-10-CM

## 2021-09-27 PROCEDURE — 82043 UR ALBUMIN QUANTITATIVE: CPT | Performed by: FAMILY MEDICINE

## 2021-09-27 PROCEDURE — 82570 ASSAY OF URINE CREATININE: CPT | Performed by: FAMILY MEDICINE

## 2021-09-27 PROCEDURE — 99214 OFFICE O/P EST MOD 30 MIN: CPT | Performed by: FAMILY MEDICINE

## 2021-09-27 PROCEDURE — 86803 HEPATITIS C AB TEST: CPT | Performed by: FAMILY MEDICINE

## 2021-09-27 PROCEDURE — 80061 LIPID PANEL: CPT | Performed by: FAMILY MEDICINE

## 2021-09-27 NOTE — PROGRESS NOTES
"Chief Complaint    Diabetes (Follow up from ER. Blood sugar elevated .)    Subjective      Peter Crook presents to [facility]     History of Present Illness    1.) UNCONTROLLED DM TYPE II : Patient presents to [facility] care - recent move here from [place] regarding project - expects to be in [place] until early 2023. Recent diagnosis of diabetes - A1C 12.20%. Patient now on both basal and short acting insulin. No concerns w/ hypoglycemia, since starting the medication. Fhx of diabetes.    2.) HTN/HLD : Stable. Due for a lipid panel. Prescribed statin during hospital stay.    3.) PREVENTIVE CARE : Reports most recent colonoscopy completed in 2020. Reports that his vaccines are per his radiation oncologist - advised against COVID vaccine per patient.     4.) SKIN LESION : Lesion of back that patient wants 'looked at.'     Objective      Vital Signs:     /80   Pulse 83   Temp 97.1 °F (36.2 °C)   Ht 191.8 cm (75.5\")   Wt 110 kg (243 lb)   SpO2 98%   BMI 29.97 kg/m²       Physical Exam  Vitals reviewed.   Constitutional:       General: He is not in acute distress.     Appearance: Normal appearance. He is well-developed.   HENT:      Head: Normocephalic and atraumatic.      Right Ear: Hearing and external ear normal.      Left Ear: Hearing and external ear normal.      Nose: Nose normal.   Eyes:      General: Lids are normal.         Right eye: No discharge.         Left eye: No discharge.      Conjunctiva/sclera: Conjunctivae normal.   Cardiovascular:      Pulses:           Dorsalis pedis pulses are 2+ on the right side and 2+ on the left side.   Pulmonary:      Effort: Pulmonary effort is normal.   Abdominal:      General: There is no distension.   Musculoskeletal:         General: No swelling.      Cervical back: Neck supple.   Feet:      Right foot:      Protective Sensation: 3 sites tested. 3 sites sensed.      Skin integrity: Skin integrity normal. No ulcer or blister.      " Toenail Condition: Right toenails are normal.      Left foot:      Protective Sensation: 3 sites tested. 3 sites sensed.      Skin integrity: Skin integrity normal. No ulcer or blister.      Toenail Condition: Left toenails are normal.      Comments:      Skin:     Coloration: Skin is not jaundiced.      Findings: No erythema.   Neurological:      Mental Status: He is alert. Mental status is at baseline.   Psychiatric:         Mood and Affect: Mood and affect normal.         Thought Content: Thought content normal.     Assessment and Plan    Diagnoses and all orders for this visit:    1. Uncontrolled type 2 diabetes mellitus with hyperglycemia (CMS/HCC) (Primary)  -     Microalbumin / Creatinine Urine Ratio - Urine, Clean Catch  -     Ambulatory Referral for Diabetic Eye Exam-Optometry    2. Dyslipidemia  -     Lipid panel    3. Encounter for hepatitis C screening test for low risk patient  -     Hepatitis C antibody    · Continue rx at current dosages. Will mail patient handout w/ nutritional recommendations regarding diabetes. Advised that if he needs additional assistance, will refer to dietician. Diabetic foot exam completed as noted. Referred to optometry for dilated retinal exam.    Follow Up     Return in about 3 months (around 12/27/2021) for uncontrolled diabetes .     Patient was given instructions and counseling regarding his condition or for health maintenance advice. Please see specific information pulled into the AVS if appropriate.

## 2021-09-27 NOTE — PROGRESS NOTES
Venipuncture Blood Specimen Collection  Venipuncture performed in left arm  by Kayleen Gregorio with good hemostasis. Patient tolerated the procedure well without complications.   09/27/21   Kayleen Gregorio

## 2021-09-28 ENCOUNTER — CLINICAL SUPPORT (OUTPATIENT)
Dept: UROLOGY | Facility: CLINIC | Age: 56
End: 2021-09-28

## 2021-09-28 DIAGNOSIS — C61 PROSTATE CANCER (HCC): Primary | ICD-10-CM

## 2021-09-28 LAB
ALBUMIN UR-MCNC: 3.5 MG/DL
CHOLEST SERPL-MCNC: 149 MG/DL (ref 0–200)
CREAT UR-MCNC: 136 MG/DL
HCV AB SER DONR QL: NORMAL
HDLC SERPL-MCNC: 32 MG/DL (ref 40–60)
LDLC SERPL CALC-MCNC: 84 MG/DL (ref 0–100)
LDLC/HDLC SERPL: 2.47 {RATIO}
MICROALBUMIN/CREAT UR: 25.7 MG/G
TRIGL SERPL-MCNC: 190 MG/DL (ref 0–150)
VLDLC SERPL-MCNC: 33 MG/DL (ref 5–40)

## 2021-09-28 PROCEDURE — 96402 CHEMO HORMON ANTINEOPL SQ/IM: CPT | Performed by: UROLOGY

## 2021-10-04 ENCOUNTER — READMISSION MANAGEMENT (OUTPATIENT)
Dept: CALL CENTER | Facility: HOSPITAL | Age: 56
End: 2021-10-04

## 2021-10-04 NOTE — OUTREACH NOTE
Medical Week 2 Survey      Responses   [facility] patient discharged from?  [facility]   Does the patient have one of the following disease processes/diagnoses(primary or secondary)?  Other   Week 2 attempt successful?  Yes   Call start time  1721   Discharge diagnosis  Hyperglycemia   Call end time  1724   Meds reviewed with patient/caregiver?  Yes   Is the patient taking all medications as directed (includes completed medication regime)?  Yes   Has the patient kept scheduled appointments due by today?  Yes   What is the patient's perception of their health status since discharge?  Improving   Week 2 Call Completed?  Yes   Graduated  Yes   Is the patient interested in additional calls from an ambulatory ?  NOTE:  applies to high risk patients requiring additional follow-up.  No   Did the patient feel the follow up calls were helpful during their recovery period?  Yes   Was the number of calls appropriate?  Yes   Graduated/Revoked comments  DOing much better.   to 150.  Afib comes and goes but it is better.  Has had follow up.          Lisa Swift, RN

## 2021-10-05 ENCOUNTER — PREP FOR SURGERY (OUTPATIENT)
Dept: OTHER | Facility: HOSPITAL | Age: 56
End: 2021-10-05

## 2021-10-05 DIAGNOSIS — C61 PROSTATE CANCER (HCC): Primary | ICD-10-CM

## 2021-10-05 RX ORDER — SODIUM CHLORIDE 0.9 % (FLUSH) 0.9 %
10 SYRINGE (ML) INJECTION AS NEEDED
Status: CANCELLED | OUTPATIENT
Start: 2021-10-05

## 2021-10-05 RX ORDER — CEFTRIAXONE SODIUM 1 G/50ML
1 INJECTION, SOLUTION INTRAVENOUS ONCE
Status: CANCELLED | OUTPATIENT
Start: 2021-10-05 | End: 2021-10-05

## 2021-10-05 RX ORDER — SODIUM CHLORIDE, SODIUM LACTATE, POTASSIUM CHLORIDE, CALCIUM CHLORIDE 600; 310; 30; 20 MG/100ML; MG/100ML; MG/100ML; MG/100ML
100 INJECTION, SOLUTION INTRAVENOUS CONTINUOUS
Status: CANCELLED | OUTPATIENT
Start: 2021-10-05

## 2021-10-05 RX ORDER — ONDANSETRON 2 MG/ML
4 INJECTION INTRAMUSCULAR; INTRAVENOUS EVERY 6 HOURS PRN
Status: CANCELLED | OUTPATIENT
Start: 2021-10-05

## 2021-10-05 RX ORDER — SODIUM CHLORIDE 0.9 % (FLUSH) 0.9 %
10 SYRINGE (ML) INJECTION EVERY 12 HOURS SCHEDULED
Status: CANCELLED | OUTPATIENT
Start: 2021-10-05

## 2021-10-06 PROBLEM — C61 PROSTATE CANCER: Status: ACTIVE | Noted: 2021-10-06

## 2021-10-07 RX ORDER — POTASSIUM CHLORIDE 1500 MG/1
20 TABLET, FILM COATED, EXTENDED RELEASE ORAL DAILY
COMMUNITY

## 2021-10-07 NOTE — PRE-PROCEDURE INSTRUCTIONS
Patient instructed to have no food past midnight, clears up to 2 hours prior to arrival time. Patient instructed to wear no lotions, jewelry or piercing's day of surgery.  Patient to take Amlodipine, 1/2 dose Levamir, Metoprolol am of surgery.  Patient voiced understanding.

## 2021-10-11 ENCOUNTER — ANESTHESIA EVENT (OUTPATIENT)
Dept: PERIOP | Facility: HOSPITAL | Age: 56
End: 2021-10-11

## 2021-10-11 ENCOUNTER — LAB (OUTPATIENT)
Dept: LAB | Facility: HOSPITAL | Age: 56
End: 2021-10-11

## 2021-10-11 ENCOUNTER — TELEPHONE (OUTPATIENT)
Dept: FAMILY MEDICINE CLINIC | Facility: CLINIC | Age: 56
End: 2021-10-11

## 2021-10-11 DIAGNOSIS — C61 PROSTATE CANCER (HCC): ICD-10-CM

## 2021-10-11 PROCEDURE — 87635 SARS-COV-2 COVID-19 AMP PRB: CPT

## 2021-10-11 PROCEDURE — C9803 HOPD COVID-19 SPEC COLLECT: HCPCS

## 2021-10-11 NOTE — TELEPHONE ENCOUNTER
Patient recently moved to here had first at home study done needing a follow up with someone locally. Can you please put in referral.

## 2021-10-12 LAB — SARS-COV-2 N GENE RESP QL NAA+PROBE: NOT DETECTED

## 2021-10-13 DIAGNOSIS — G47.00 INSOMNIA, UNSPECIFIED TYPE: Primary | ICD-10-CM

## 2021-10-14 NOTE — H&P
[facility]   HISTORY AND PHYSICAL    Patient Name: Peter Crook  : 1965  MRN: [id number]  Primary Care Physician:  Jennifer Mckeon DO  Date of admission: 10/15/2021  Subjective   Subjective     Chief Complaint: Prostate carcinoma.  Patient has hormone sensitive metastatic disease and on cystoscopy has a small area at the bladder neck which could be prostate gland.  Patient is coming in for the biopsy of dysuria and fulguration just to document were not dealing with local recurrence of prostate cancer    HPI:    Peter Crook is a 56 y.o. male who has hormone sensitive metastatic disease from prostate carcinoma.  He has a lesion in the bladder neck and patient is here for biopsy.    Review of Systems    Please see past medical and surgical history.  Rest of the system is negative    Personal History     Past Medical History:   Diagnosis Date   • Adenocarcinoma of prostate (HCC)     negetive prostate biopsy 2019    • Afib (HCC)    • Bladder diverticulum    • ED (erectile dysfunction)    • Hyperlipidemia    • Hypertension    • Spermatocele        Past Surgical History:   Procedure Laterality Date   • APPENDECTOMY     • BLADDER DIVERTICULECTOMY      2020    • HYDROCELECTOMY     • PROSTATECTOMY     • TURP / TRANSURETHRAL INCISION / DRAINAGE PROSTATE  2020   • VASECTOMY         Family History: family history includes Cancer in his father; Hypertension in his father; No Known Problems in his brother, cousin, daughter, maternal grandfather, maternal grandmother, mother, paternal grandfather, paternal grandmother, sister, son, and another family member. Otherwise pertinent FHx was reviewed and not pertinent to current issue.    Social History:  reports that he has quit smoking. His smoking use included cigarettes. He has a 32.00 pack-year smoking history. He has never used smokeless tobacco. He reports previous alcohol use. He reports that he does not use drugs.    Home Medications:  Insulin  Lispro, Magnesium, Multiple Vitamins-Minerals, ONE TOUCH ULTRA MINI, OneTouch Delica Lancets 33G, Pen Needles, amLODIPine, apixaban, chlorthalidone, glucose blood, insulin detemir, leuprolide, metoprolol succinate XL, potassium chloride ER, rosuvastatin, and valsartan      Allergies:  No Known Allergies    Objective   Objective     Vitals: Blood pressure 135/90.  Pulse 79.  Temperature 97.6     Physical Exam     Physical Exam  Constitutional:       General: He is not in acute distress.     Appearance: Normal appearance. He is normal weight. He is not ill-appearing or toxic-appearing.   HENT:      Head: Normocephalic.      Nose: Nose normal.   Eyes:      Pupils: Pupils are equal, round, and reactive to light.   Cardiovascular:      Rate and Rhythm: Normal rate and regular rhythm.      Pulses: Normal pulses.      Heart sounds: Normal heart sounds. No murmur heard.      Pulmonary:      Effort: Pulmonary effort is normal. No respiratory distress.      Breath sounds: Normal breath sounds. No rhonchi or rales.   Abdominal:      General: Abdomen is flat. There is no distension.      Palpations: Abdomen is soft.      Tenderness: There is no abdominal tenderness. There is no right CVA tenderness or left CVA tenderness.   Genitourinary:     Penis: Normal.       Testes: Normal.      Rectum: Normal.      Comments: Prostate gland is surgically absent    Right and left scrotum is normal  Musculoskeletal:         General: No swelling. Normal range of motion.      Cervical back: Normal range of motion and neck supple. Tenderness present. No rigidity.   Lymphadenopathy:      Cervical: No cervical adenopathy.   Skin:     General: Skin is warm.      Coloration: Skin is not jaundiced.   Neurological:      General: No focal deficit present.      Mental Status: He is alert and oriented to person, place, and time.      Motor: No weakness.      Gait: Gait normal.   Psychiatric:         Mood and Affect: Mood normal.         Behavior:  Behavior normal.         Judgment: Judgment normal.         Result Review    Result Review:  I have personally reviewed the results from the time of this admission to 10/14/2021 12:05 EDT and agree with these findings:  []  Laboratory  []  Microbiology  []  Radiology  []  EKG/Telemetry   []  Cardiology/Vascular   []  Pathology  []  Old records  []  Other:  Most notable findings include: On cystoscopy has a small area at the bladder neck which could be prostate gland.  With a history of prostate cancer is concerning    Assessment/Plan   Assessment / Plan     Brief Patient Summary:  Peter Crook is a 56 y.o. male who who has hormone sensitive metastatic prostate carcinoma.  On cystoscopy has a small lesion at the bladder neck which again could be prostate and a biopsy is suggested to document if it is a recurrent carcinoma or not    Active Hospital Problems:  Active Hospital Problems    Diagnosis    • **Prostate cancer (HCC)      Added automatically from request for surgery [id number]         Plan: Cystoscopy and biopsy of lesion at the bladder neck.  Risk benefits and alternate treatment has been discussed especially worsening of the incontinence after the biopsy of the bladder neck.  Infection bleeding and injury to urethra urinary bladder is discussed and accepted by the patient      DVT prophylaxis:  No DVT prophylaxis order currently exists.    CODE STATUS:       Admission Status:  I believe this patient meets outpatient observation status.    Electronically signed by Filiberto Sykes MD, 10/14/21, 12:05 PM EDT.

## 2021-10-15 ENCOUNTER — HOSPITAL ENCOUNTER (OUTPATIENT)
Facility: HOSPITAL | Age: 56
Setting detail: HOSPITAL OUTPATIENT SURGERY
Discharge: HOME | End: 2021-10-15
Attending: UROLOGY | Admitting: UROLOGY

## 2021-10-15 ENCOUNTER — ANESTHESIA (OUTPATIENT)
Dept: PERIOP | Facility: HOSPITAL | Age: 56
End: 2021-10-15

## 2021-10-15 VITALS
TEMPERATURE: 97.5 F | DIASTOLIC BLOOD PRESSURE: 73 MMHG | HEART RATE: 65 BPM | OXYGEN SATURATION: 98 % | HEIGHT: 75 IN | RESPIRATION RATE: 18 BRPM | WEIGHT: 242.51 LBS | SYSTOLIC BLOOD PRESSURE: 115 MMHG | BODY MASS INDEX: 30.15 KG/M2

## 2021-10-15 DIAGNOSIS — C61 PROSTATE CANCER (HCC): ICD-10-CM

## 2021-10-15 LAB
ALBUMIN SERPL-MCNC: 4.4 G/DL (ref 3.5–5.2)
ALBUMIN/GLOB SERPL: 1.5 G/DL
ALP SERPL-CCNC: 83 U/L (ref 39–117)
ALT SERPL W P-5'-P-CCNC: 100 U/L (ref 1–41)
ANION GAP SERPL CALCULATED.3IONS-SCNC: 11.3 MMOL/L (ref 5–15)
AST SERPL-CCNC: 88 U/L (ref 1–40)
BASOPHILS # BLD AUTO: 0.02 10*3/MM3 (ref 0–0.2)
BASOPHILS NFR BLD AUTO: 0.4 % (ref 0–1.5)
BILIRUB SERPL-MCNC: 0.7 MG/DL (ref 0–1.2)
BUN SERPL-MCNC: 28 MG/DL (ref 6–20)
BUN/CREAT SERPL: 30.8 (ref 7–25)
CALCIUM SPEC-SCNC: 9.6 MG/DL (ref 8.6–10.5)
CHLORIDE SERPL-SCNC: 102 MMOL/L (ref 98–107)
CO2 SERPL-SCNC: 26.7 MMOL/L (ref 22–29)
CREAT SERPL-MCNC: 0.91 MG/DL (ref 0.76–1.27)
DEPRECATED RDW RBC AUTO: 39.8 FL (ref 37–54)
EOSINOPHIL # BLD AUTO: 0.08 10*3/MM3 (ref 0–0.4)
EOSINOPHIL NFR BLD AUTO: 1.7 % (ref 0.3–6.2)
ERYTHROCYTE [DISTWIDTH] IN BLOOD BY AUTOMATED COUNT: 13.5 % (ref 12.3–15.4)
GFR SERPL CREATININE-BSD FRML MDRD: 86 ML/MIN/1.73
GLOBULIN UR ELPH-MCNC: 2.9 GM/DL
GLUCOSE BLDC GLUCOMTR-MCNC: 114 MG/DL (ref 70–99)
GLUCOSE SERPL-MCNC: 132 MG/DL (ref 65–99)
HCT VFR BLD AUTO: 37.7 % (ref 37.5–51)
HGB BLD-MCNC: 12.7 G/DL (ref 13–17.7)
IMM GRANULOCYTES # BLD AUTO: 0.02 10*3/MM3 (ref 0–0.05)
IMM GRANULOCYTES NFR BLD AUTO: 0.4 % (ref 0–0.5)
LYMPHOCYTES # BLD AUTO: 1.09 10*3/MM3 (ref 0.7–3.1)
LYMPHOCYTES NFR BLD AUTO: 23.5 % (ref 19.6–45.3)
MCH RBC QN AUTO: 27.8 PG (ref 26.6–33)
MCHC RBC AUTO-ENTMCNC: 33.7 G/DL (ref 31.5–35.7)
MCV RBC AUTO: 82.5 FL (ref 79–97)
MONOCYTES # BLD AUTO: 0.31 10*3/MM3 (ref 0.1–0.9)
MONOCYTES NFR BLD AUTO: 6.7 % (ref 5–12)
NEUTROPHILS NFR BLD AUTO: 3.12 10*3/MM3 (ref 1.7–7)
NEUTROPHILS NFR BLD AUTO: 67.3 % (ref 42.7–76)
NRBC BLD AUTO-RTO: 0 /100 WBC (ref 0–0.2)
PLATELET # BLD AUTO: 171 10*3/MM3 (ref 140–450)
PMV BLD AUTO: 9.4 FL (ref 6–12)
POTASSIUM SERPL-SCNC: 3.7 MMOL/L (ref 3.5–5.2)
PROT SERPL-MCNC: 7.3 G/DL (ref 6–8.5)
RBC # BLD AUTO: 4.57 10*6/MM3 (ref 4.14–5.8)
SODIUM SERPL-SCNC: 140 MMOL/L (ref 136–145)
WBC # BLD AUTO: 4.64 10*3/MM3 (ref 3.4–10.8)

## 2021-10-15 PROCEDURE — 52204 CYSTOSCOPY W/BIOPSY(S): CPT | Performed by: UROLOGY

## 2021-10-15 PROCEDURE — 82962 GLUCOSE BLOOD TEST: CPT

## 2021-10-15 PROCEDURE — 85025 COMPLETE CBC W/AUTO DIFF WBC: CPT | Performed by: UROLOGY

## 2021-10-15 PROCEDURE — 25010000002 MIDAZOLAM PER 1MG: Performed by: ANESTHESIOLOGY

## 2021-10-15 PROCEDURE — 25010000002 PROPOFOL 10 MG/ML EMULSION: Performed by: NURSE ANESTHETIST, CERTIFIED REGISTERED

## 2021-10-15 PROCEDURE — 88305 TISSUE EXAM BY PATHOLOGIST: CPT | Performed by: UROLOGY

## 2021-10-15 PROCEDURE — 25010000002 CEFTRIAXONE PER 250 MG: Performed by: UROLOGY

## 2021-10-15 PROCEDURE — 25010000002 FENTANYL CITRATE (PF) 50 MCG/ML SOLUTION: Performed by: NURSE ANESTHETIST, CERTIFIED REGISTERED

## 2021-10-15 PROCEDURE — 80053 COMPREHEN METABOLIC PANEL: CPT | Performed by: UROLOGY

## 2021-10-15 RX ORDER — PROMETHAZINE HYDROCHLORIDE 25 MG/1
25 SUPPOSITORY RECTAL ONCE AS NEEDED
Status: DISCONTINUED | OUTPATIENT
Start: 2021-10-15 | End: 2021-10-15 | Stop reason: HOSPADM

## 2021-10-15 RX ORDER — SODIUM CHLORIDE 0.9 % (FLUSH) 0.9 %
10 SYRINGE (ML) INJECTION EVERY 12 HOURS SCHEDULED
Status: DISCONTINUED | OUTPATIENT
Start: 2021-10-15 | End: 2021-10-15 | Stop reason: HOSPADM

## 2021-10-15 RX ORDER — CEFTRIAXONE SODIUM 1 G/50ML
1 INJECTION, SOLUTION INTRAVENOUS ONCE
Status: COMPLETED | OUTPATIENT
Start: 2021-10-15 | End: 2021-10-15

## 2021-10-15 RX ORDER — FENTANYL CITRATE 50 UG/ML
INJECTION, SOLUTION INTRAMUSCULAR; INTRAVENOUS AS NEEDED
Status: DISCONTINUED | OUTPATIENT
Start: 2021-10-15 | End: 2021-10-15 | Stop reason: SURG

## 2021-10-15 RX ORDER — KETAMINE HYDROCHLORIDE 50 MG/ML
INJECTION, SOLUTION, CONCENTRATE INTRAMUSCULAR; INTRAVENOUS AS NEEDED
Status: DISCONTINUED | OUTPATIENT
Start: 2021-10-15 | End: 2021-10-15 | Stop reason: SURG

## 2021-10-15 RX ORDER — MIDAZOLAM HYDROCHLORIDE 2 MG/2ML
2 INJECTION, SOLUTION INTRAMUSCULAR; INTRAVENOUS ONCE
Status: COMPLETED | OUTPATIENT
Start: 2021-10-15 | End: 2021-10-15

## 2021-10-15 RX ORDER — ACETAMINOPHEN 500 MG
1000 TABLET ORAL ONCE
Status: COMPLETED | OUTPATIENT
Start: 2021-10-15 | End: 2021-10-15

## 2021-10-15 RX ORDER — SODIUM CHLORIDE 0.9 % (FLUSH) 0.9 %
10 SYRINGE (ML) INJECTION AS NEEDED
Status: DISCONTINUED | OUTPATIENT
Start: 2021-10-15 | End: 2021-10-15 | Stop reason: HOSPADM

## 2021-10-15 RX ORDER — SODIUM CHLORIDE, SODIUM LACTATE, POTASSIUM CHLORIDE, CALCIUM CHLORIDE 600; 310; 30; 20 MG/100ML; MG/100ML; MG/100ML; MG/100ML
100 INJECTION, SOLUTION INTRAVENOUS CONTINUOUS
Status: DISCONTINUED | OUTPATIENT
Start: 2021-10-15 | End: 2021-10-15 | Stop reason: HOSPADM

## 2021-10-15 RX ORDER — LIDOCAINE HYDROCHLORIDE 20 MG/ML
INJECTION, SOLUTION INFILTRATION; PERINEURAL AS NEEDED
Status: DISCONTINUED | OUTPATIENT
Start: 2021-10-15 | End: 2021-10-15 | Stop reason: SURG

## 2021-10-15 RX ORDER — MAGNESIUM HYDROXIDE 1200 MG/15ML
LIQUID ORAL AS NEEDED
Status: DISCONTINUED | OUTPATIENT
Start: 2021-10-15 | End: 2021-10-15 | Stop reason: HOSPADM

## 2021-10-15 RX ORDER — PROMETHAZINE HYDROCHLORIDE 12.5 MG/1
25 TABLET ORAL ONCE AS NEEDED
Status: DISCONTINUED | OUTPATIENT
Start: 2021-10-15 | End: 2021-10-15 | Stop reason: HOSPADM

## 2021-10-15 RX ORDER — MEPERIDINE HYDROCHLORIDE 25 MG/ML
12.5 INJECTION INTRAMUSCULAR; INTRAVENOUS; SUBCUTANEOUS
Status: DISCONTINUED | OUTPATIENT
Start: 2021-10-15 | End: 2021-10-15 | Stop reason: HOSPADM

## 2021-10-15 RX ORDER — SODIUM CHLORIDE, SODIUM LACTATE, POTASSIUM CHLORIDE, CALCIUM CHLORIDE 600; 310; 30; 20 MG/100ML; MG/100ML; MG/100ML; MG/100ML
9 INJECTION, SOLUTION INTRAVENOUS CONTINUOUS PRN
Status: DISCONTINUED | OUTPATIENT
Start: 2021-10-15 | End: 2021-10-15 | Stop reason: HOSPADM

## 2021-10-15 RX ORDER — PROPOFOL 10 MG/ML
VIAL (ML) INTRAVENOUS AS NEEDED
Status: DISCONTINUED | OUTPATIENT
Start: 2021-10-15 | End: 2021-10-15 | Stop reason: SURG

## 2021-10-15 RX ORDER — OXYCODONE HYDROCHLORIDE 5 MG/1
5 TABLET ORAL
Status: DISCONTINUED | OUTPATIENT
Start: 2021-10-15 | End: 2021-10-15 | Stop reason: HOSPADM

## 2021-10-15 RX ORDER — ONDANSETRON 2 MG/ML
4 INJECTION INTRAMUSCULAR; INTRAVENOUS ONCE AS NEEDED
Status: DISCONTINUED | OUTPATIENT
Start: 2021-10-15 | End: 2021-10-15 | Stop reason: HOSPADM

## 2021-10-15 RX ADMIN — LIDOCAINE HYDROCHLORIDE 100 MG: 20 INJECTION, SOLUTION INFILTRATION; PERINEURAL at 12:14

## 2021-10-15 RX ADMIN — ACETAMINOPHEN 1000 MG: 500 TABLET ORAL at 12:02

## 2021-10-15 RX ADMIN — CEFTRIAXONE SODIUM 1 G: 1 INJECTION, SOLUTION INTRAVENOUS at 12:17

## 2021-10-15 RX ADMIN — PROPOFOL 50 MG: 10 INJECTION, EMULSION INTRAVENOUS at 12:14

## 2021-10-15 RX ADMIN — SODIUM CHLORIDE, POTASSIUM CHLORIDE, SODIUM LACTATE AND CALCIUM CHLORIDE 100 ML/HR: 600; 310; 30; 20 INJECTION, SOLUTION INTRAVENOUS at 10:35

## 2021-10-15 RX ADMIN — MIDAZOLAM HYDROCHLORIDE 2 MG: 1 INJECTION, SOLUTION INTRAMUSCULAR; INTRAVENOUS at 12:04

## 2021-10-15 RX ADMIN — FENTANYL CITRATE 50 MCG: 50 INJECTION INTRAMUSCULAR; INTRAVENOUS at 12:14

## 2021-10-15 RX ADMIN — KETAMINE HYDROCHLORIDE 10 MG: 50 INJECTION, SOLUTION INTRAMUSCULAR; INTRAVENOUS at 12:22

## 2021-10-15 RX ADMIN — PROPOFOL 120 MCG/KG/MIN: 10 INJECTION, EMULSION INTRAVENOUS at 12:14

## 2021-10-15 RX ADMIN — KETAMINE HYDROCHLORIDE 10 MG: 50 INJECTION, SOLUTION INTRAMUSCULAR; INTRAVENOUS at 12:29

## 2021-10-15 NOTE — OP NOTE
CYSTOSCOPY BLADDER BIOPSY  Procedure Report    Patient Name:  Peter Crook  YOB: 1965    Date of Surgery:  10/15/2021     Indications: Metastatic prostate carcinoma    Lesion at the bladder neck which could be prostate cancer    Pre-op Diagnosis:   Prostate cancer (HCC) [C61]       Post-Op Diagnosis Codes:     * Prostate cancer (HCC) [C61]           Procedure(s):  CYSTOSCOPY;  BLADDER NECK BIOPSY; FULGURATION OF BLADDER NECK    Staff:  Surgeon(s):  Filiberto Sykes MD         Anesthesia: General    Estimated Blood Loss: 0    Implants:    Nothing was implanted during the procedure    Specimen:          Specimens     ID Source Type Tests Collected By Collected At Frozen?    A Urinary Bladder Tissue · TISSUE PATHOLOGY EXAM   Filiberto Sykes MD 10/15/21 1223     Description: BLADDER NECK BIOPSY              Findings: Prominent fold at the bladder neck.  Does not look like an angry lesion    Complications: No complications    Description of Procedure: Patient was placed in the lithotomy position and thorough scrubbing her lower abdomen external genitalia was performed.  We did sterile draping after that.  22.5 cystoscope was inserted urethra was looked at which was fine.  There is risk for latency at 12 o'clock position of bladder neck and it does not look angry.  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  I do not see any bladder tumors.    Using a biopsy forcep a small area of this fold was removed and then using Bugbee electrode fulguration of bleeders was performed at the bladder neck.  Patient tolerated procedure well sent to recovery room in good condition.  I will let the patient know about the pathology report and recheck him in 6 months time in the office.  Patient will continue to have Depo-Lupron through my office every 3 months.          Filiberto Sykes MD     Date: 10/15/2021  Time: 12:41 EDT

## 2021-10-15 NOTE — ANESTHESIA POSTPROCEDURE EVALUATION
Patient: Peter Crook    Procedure Summary     Date: 10/15/21 Room / Location: [facility] OR 07 / [facility] MAIN OR    Anesthesia Start: 1208 Anesthesia Stop: 1239    Procedure: CYSTOSCOPY;  BLADDER NECK BIOPSY; FULGURATION OF BLADDER NECK (N/A ) Diagnosis:       Prostate cancer (HCC)      (Prostate cancer (HCC) [C61])    Surgeons: Filiberto Sykes MD Provider: Abhinav Dumont MD    Anesthesia Type: general ASA Status: 3          Anesthesia Type: general    Vitals  Vitals Value Taken Time   /81 10/15/21 1244   Temp     Pulse 64 10/15/21 1246   Resp     SpO2 100 % 10/15/21 1246   Vitals shown include unvalidated device data.        Post Anesthesia Care and Evaluation    Patient location during evaluation: bedside  Patient participation: complete - patient participated  Level of consciousness: awake and alert  Pain management: adequate  Airway patency: patent  Anesthetic complications: No anesthetic complications  PONV Status: none  Cardiovascular status: acceptable  Respiratory status: acceptable  Hydration status: acceptable

## 2021-10-15 NOTE — ANESTHESIA PREPROCEDURE EVALUATION
Anesthesia Evaluation     Patient summary reviewed and Nursing notes reviewed   no history of anesthetic complications:  NPO Solid Status: > 8 hours  NPO Liquid Status: > 2 hours           Airway   Mallampati: II  TM distance: >3 FB  Neck ROM: full  No difficulty expected  Dental      Pulmonary - negative pulmonary ROS and normal exam    breath sounds clear to auscultation  Cardiovascular - normal exam  Exercise tolerance: good (4-7 METS)    Rhythm: regular    (+) hypertension, dysrhythmias, hyperlipidemia,       Neuro/Psych- negative ROS  GI/Hepatic/Renal/Endo    (+)   diabetes mellitus,     Musculoskeletal (-) negative ROS    Abdominal    Substance History - negative use     OB/GYN negative ob/gyn ROS         Other      history of cancer                    Anesthesia Plan    ASA 3     general   (Total IV Anesthesia    Patient understands anesthesia not responsible for dental damage.  )  intravenous induction     Anesthetic plan, all risks, benefits, and alternatives have been provided, discussed and informed consent has been obtained with: patient.    Plan discussed with CRNA.

## 2021-10-18 LAB
CYTO UR: NORMAL
LAB AP CASE REPORT: NORMAL
LAB AP CLINICAL INFORMATION: NORMAL
PATH REPORT.FINAL DX SPEC: NORMAL
PATH REPORT.GROSS SPEC: NORMAL

## 2021-10-19 RX ORDER — ROSUVASTATIN CALCIUM 20 MG/1
20 TABLET, COATED ORAL DAILY
Qty: 90 TABLET | Refills: 1 | Status: SHIPPED | OUTPATIENT
Start: 2021-10-19 | End: 2022-09-29 | Stop reason: SDUPTHER

## 2021-10-19 RX ORDER — METOPROLOL SUCCINATE 200 MG/1
100 TABLET, EXTENDED RELEASE ORAL 2 TIMES DAILY
Qty: 180 TABLET | Refills: 1 | Status: SHIPPED | OUTPATIENT
Start: 2021-10-19 | End: 2022-10-11

## 2021-11-15 RX ORDER — INSULIN LISPRO 200 [IU]/ML
15 INJECTION, SOLUTION SUBCUTANEOUS
Qty: 20.25 ML | Refills: 1 | Status: SHIPPED | OUTPATIENT
Start: 2021-11-15 | End: 2021-11-19 | Stop reason: SDUPTHER

## 2021-11-15 RX ORDER — INSULIN DETEMIR 100 [IU]/ML
25 INJECTION, SOLUTION SUBCUTANEOUS 2 TIMES DAILY
Qty: 15 PEN | Refills: 1 | Status: SHIPPED | OUTPATIENT
Start: 2021-11-15 | End: 2022-01-17

## 2021-11-19 RX ORDER — INSULIN LISPRO 200 [IU]/ML
15 INJECTION, SOLUTION SUBCUTANEOUS
Qty: 20.25 ML | Refills: 1 | Status: SHIPPED | OUTPATIENT
Start: 2021-11-19 | End: 2021-11-19

## 2021-12-06 ENCOUNTER — OFFICE VISIT (OUTPATIENT)
Dept: SLEEP MEDICINE | Facility: HOSPITAL | Age: 56
End: 2021-12-06

## 2021-12-06 VITALS
SYSTOLIC BLOOD PRESSURE: 133 MMHG | OXYGEN SATURATION: 98 % | BODY MASS INDEX: 30.56 KG/M2 | DIASTOLIC BLOOD PRESSURE: 98 MMHG | HEIGHT: 76 IN | TEMPERATURE: 97 F | HEART RATE: 73 BPM | WEIGHT: 251 LBS

## 2021-12-06 DIAGNOSIS — I48.20 CHRONIC ATRIAL FIBRILLATION (HCC): ICD-10-CM

## 2021-12-06 DIAGNOSIS — G47.33 OSA (OBSTRUCTIVE SLEEP APNEA): Primary | ICD-10-CM

## 2021-12-06 DIAGNOSIS — R06.83 SNORING: ICD-10-CM

## 2021-12-06 PROCEDURE — G0463 HOSPITAL OUTPT CLINIC VISIT: HCPCS | Performed by: INTERNAL MEDICINE

## 2021-12-06 PROCEDURE — 99203 OFFICE O/P NEW LOW 30 MIN: CPT | Performed by: INTERNAL MEDICINE

## 2021-12-06 NOTE — PROGRESS NOTES
[facility]  [address] [place] [address]  [place]   [place] [address]  Phone: [phone]  Fax: [phone]      Peter Crook  1965  56 y.o.  male      Referring physician/provider and PCP Jennifer Mckeon DO    Type of service: Initial Sleep Medicine Consult.  Date of service: 12/6/2021      Chief Complaint   Patient presents with   • Sleep Apnea   • Obesity       History of present illness;  Thank you for asking to see Peter Crook, 56 y.o.. The patient was seen today on 12/6/2021 at [facility].  Patient recently had a home sleep test and found to have mild sleep apnea with AHI of 9.3/h.  The home sleep test was conducted in [facility].  The date of the study is February 11, 2021.  I have reviewed the sleep study.  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  He had developed atrial fibrillation and sleep apnea now he is not allowed to fly until he is treated for both conditions.  Patient gives the following sleep history.  Sleep schedule:  Bedtime: 9 PM  Wake time: 4:30 AM  Normally takes about few minutes to fall asleep  Average hours of sleep 6-7  Number of naps per day none  Symptoms  In addition to snoring, nonrestorative sleep and witnessed apneas patient gives the following associated symptoms.  Have you ever awakened gasping for breath, coughing, choking: Yes   Change in weight,  No   Morning headaches  No       MEDICAL CONDITIONS (PMH)  1. Atrial fibrillation  2. Sleep apnea  3. Hypertension  4. Diabetes mellitus    Social history:  Do you drive a commercial vehicle:  No   He is a  flies a small plane  Shift work:  No   Tobacco use:  Yes   Alcohol use: 0 per week  Caffeinated drinks: 3    Family Hx (your parents and siblings)  1. Narcolepsy, sister      Medications: reviewed    Review of systems:  Sleep: Positve for snoring,witnessed apnea and daytime excessive sleepiness with [place] Sleepiness Scale of Total  "score: 7   Kidney/ Bladder  Difficulty In Urination: negative  Bed Wetting: negative  Frequent Urination: positive  HEENT  Recurrent Nose Bleeds: negative  Ear pain: negative  Sores In Mouth: negative  Persistent Hoarseness: negative  Nasal Congestion: positive  Post Nasal Drip: negative  Musculoskeletal:  Neck Pain: negative  Temporomandibular Joint Pain: negative  General:  Fever: negative  Fatigue: negative  Cardiovascular:  Irregular Heartbeat: positive  Swollen Ankles Or Legs: negative  Respiratory:  Shortness Of Breath: negative  Wheezing: negative  Neuro/Paych:  Fainting Spells: negative  Dizziness: negative  Anxiety: negative  Depression: negative  Gastrointestinal:  Problem Swallowing: negative  Frequent Heartburn: negative  Abdominal Bloating: negative  Skin:  Rash: negative  Change In Nails: negative  Endocrine:  Excessive Thirst: negative  Always Too Cold: negative  Always Too Warm: negative  Hem/Lymphatic:  Swollen Glands: negative  Burses/ Bleeds Easily: negative    Physical exam:  CONSTITUTINONAL:  Vitals:    12/06/21 1300   BP: 133/98   Pulse: 73   Temp: 97 °F (36.1 °C)   SpO2: 98%   Weight: 114 kg (251 lb)   Height: 193 cm (76\")    Body mass index is 30.55 kg/m².   HEAD: atraumatic, normocephalic   EYES:pupils are round, equal and reacting to light and accommodation, conjunctiva normal  NOSE:no nasal septal defects, nasal passages are clear, no nasal polyps,   THROAT: tonsils are not enlarged, tongue normal size, oral airway Mallampati class 3  NECK:Neck Circumference: 16.5 inches, trachea is in the midline, thyroid not enlarged  RESPIRATORY SYSTEM: Breath sounds are normal, there are no wheezes  CARDIOVASULAR SYSTEM: Heart sounds are regular rhythm and normal rate, there are no murmurs or thrills, no edema  GASTROINTESTINAL: Soft and non-tender,liver not enlarged, no evidence of ascites  MUSCULOSKELETAL SYSTEM: Full range of motion's in all the 4 extremities, neck movement not restricted, " temporomandibular joint movement normal and no tenderness  SKIN: Warm and moist, no cyanosis, no clubbing,  NEUROLOGICAL SYSTEM: Oriented x 3, no gross neurological defects, No speech defect, gait is normal  PSYCHIATRIC SYSTEM: Mood is normal, thought content is normal    Labs reviewed.  TSH Results:     Most Recent A1C    HGBA1C Most Recent 9/20/21   Hemoglobin A1C 12.20 (A)   (A) Abnormal value               Assessment and plan:  · Obstructive sleep apnea ( G 47.33).  I have reviewed his sleep study.  Need to set up a auto CPAP.  I have sent a order to [facility] for auto CPAP and see me back at the 1 to 90 days for compliance check.  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 I have discussed the relationship between weight and sleep apnea.There is direct correlation between weight and severity of sleep apnea.  Weight reduction is encouraged, as it is going to reduce the severity of sleep apnea. I have also discussed with the patient diet and exercise to achieve ideal body weight.  · Atrial fibrillation,        Please note that the patient's address use the local address  [facility], [place] [address]  Phone     I have also discussed with the patient the following  • Sleep hygiene: Maintaining a regular bedtime and wake time, not to watch television or work in bed, limit caffeine-containing beverages before bed time and avoid naps during the day  • Adequate amount of sleep.  Generally most people needs about 7 to 8 hours of sleep.    Return for 31 to 90 days after PAP setup with down load..  Patient's questions were answered      I once again thank you for asking me to see this patient in consultation and I have forwarded my opinion and treatment plan.  Please do not hesitate to call me if you have any questions.     Peter Rossi MD  Sleep Medicine  Medical Director, [facility]  [facility]  12/6/2021 ,

## 2021-12-07 PROBLEM — I51.89 DIASTOLIC DYSFUNCTION: Status: ACTIVE | Noted: 2020-10-23

## 2021-12-07 PROBLEM — E78.5 HYPERLIPIDEMIA: Status: ACTIVE | Noted: 2020-09-04

## 2021-12-07 PROBLEM — I77.810 ASCENDING AORTA DILATATION: Status: ACTIVE | Noted: 2020-10-23

## 2021-12-07 PROBLEM — N32.89 BLADDER WALL THICKENING: Status: ACTIVE | Noted: 2020-09-15

## 2021-12-07 PROBLEM — I10 ESSENTIAL HYPERTENSION: Status: ACTIVE | Noted: 2020-09-04

## 2021-12-20 ENCOUNTER — OFFICE VISIT (OUTPATIENT)
Dept: FAMILY MEDICINE CLINIC | Facility: CLINIC | Age: 56
End: 2021-12-20

## 2021-12-20 VITALS
WEIGHT: 251 LBS | DIASTOLIC BLOOD PRESSURE: 78 MMHG | BODY MASS INDEX: 30.55 KG/M2 | SYSTOLIC BLOOD PRESSURE: 122 MMHG | OXYGEN SATURATION: 99 % | TEMPERATURE: 97.3 F | HEART RATE: 77 BPM

## 2021-12-20 DIAGNOSIS — Z23 NEED FOR VACCINATION: ICD-10-CM

## 2021-12-20 DIAGNOSIS — E11.9 INSULIN DEPENDENT TYPE 2 DIABETES MELLITUS (HCC): Primary | ICD-10-CM

## 2021-12-20 DIAGNOSIS — I10 ESSENTIAL HYPERTENSION: ICD-10-CM

## 2021-12-20 DIAGNOSIS — Z79.4 INSULIN DEPENDENT TYPE 2 DIABETES MELLITUS (HCC): Primary | ICD-10-CM

## 2021-12-20 LAB
ALBUMIN SERPL-MCNC: 4.4 G/DL (ref 3.5–5.2)
ALBUMIN UR-MCNC: 1.7 MG/DL
ALBUMIN/GLOB SERPL: 1.3 G/DL
ALP SERPL-CCNC: 99 U/L (ref 39–117)
ALT SERPL W P-5'-P-CCNC: 48 U/L (ref 1–41)
ANION GAP SERPL CALCULATED.3IONS-SCNC: 8.6 MMOL/L (ref 5–15)
AST SERPL-CCNC: 33 U/L (ref 1–40)
BASOPHILS # BLD AUTO: 0.03 10*3/MM3 (ref 0–0.2)
BASOPHILS NFR BLD AUTO: 0.4 % (ref 0–1.5)
BILIRUB SERPL-MCNC: 0.4 MG/DL (ref 0–1.2)
BUN SERPL-MCNC: 20 MG/DL (ref 6–20)
BUN/CREAT SERPL: 25 (ref 7–25)
CALCIUM SPEC-SCNC: 10.5 MG/DL (ref 8.6–10.5)
CHLORIDE SERPL-SCNC: 100 MMOL/L (ref 98–107)
CHOLEST SERPL-MCNC: 156 MG/DL (ref 0–200)
CO2 SERPL-SCNC: 29.4 MMOL/L (ref 22–29)
CREAT SERPL-MCNC: 0.8 MG/DL (ref 0.76–1.27)
CREAT UR-MCNC: 70.6 MG/DL
DEPRECATED RDW RBC AUTO: 42.4 FL (ref 37–54)
EOSINOPHIL # BLD AUTO: 0.33 10*3/MM3 (ref 0–0.4)
EOSINOPHIL NFR BLD AUTO: 4.4 % (ref 0.3–6.2)
ERYTHROCYTE [DISTWIDTH] IN BLOOD BY AUTOMATED COUNT: 14.1 % (ref 12.3–15.4)
GFR SERPL CREATININE-BSD FRML MDRD: 100 ML/MIN/1.73
GLOBULIN UR ELPH-MCNC: 3.3 GM/DL
GLUCOSE SERPL-MCNC: 122 MG/DL (ref 65–99)
HBA1C MFR BLD: 6.19 % (ref 4.8–5.6)
HCT VFR BLD AUTO: 41.9 % (ref 37.5–51)
HDLC SERPL-MCNC: 42 MG/DL (ref 40–60)
HGB BLD-MCNC: 13.7 G/DL (ref 13–17.7)
IMM GRANULOCYTES # BLD AUTO: 0.02 10*3/MM3 (ref 0–0.05)
IMM GRANULOCYTES NFR BLD AUTO: 0.3 % (ref 0–0.5)
LDLC SERPL CALC-MCNC: 79 MG/DL (ref 0–100)
LDLC/HDLC SERPL: 1.72 {RATIO}
LYMPHOCYTES # BLD AUTO: 1.86 10*3/MM3 (ref 0.7–3.1)
LYMPHOCYTES NFR BLD AUTO: 25 % (ref 19.6–45.3)
MCH RBC QN AUTO: 27.1 PG (ref 26.6–33)
MCHC RBC AUTO-ENTMCNC: 32.7 G/DL (ref 31.5–35.7)
MCV RBC AUTO: 83 FL (ref 79–97)
MICROALBUMIN/CREAT UR: 24.1 MG/G
MONOCYTES # BLD AUTO: 0.51 10*3/MM3 (ref 0.1–0.9)
MONOCYTES NFR BLD AUTO: 6.8 % (ref 5–12)
NEUTROPHILS NFR BLD AUTO: 4.7 10*3/MM3 (ref 1.7–7)
NEUTROPHILS NFR BLD AUTO: 63.1 % (ref 42.7–76)
NRBC BLD AUTO-RTO: 0 /100 WBC (ref 0–0.2)
PLATELET # BLD AUTO: 222 10*3/MM3 (ref 140–450)
PMV BLD AUTO: 10 FL (ref 6–12)
POTASSIUM SERPL-SCNC: 3.9 MMOL/L (ref 3.5–5.2)
PROT SERPL-MCNC: 7.7 G/DL (ref 6–8.5)
RBC # BLD AUTO: 5.05 10*6/MM3 (ref 4.14–5.8)
SODIUM SERPL-SCNC: 138 MMOL/L (ref 136–145)
TRIGL SERPL-MCNC: 209 MG/DL (ref 0–150)
VLDLC SERPL-MCNC: 35 MG/DL (ref 5–40)
WBC NRBC COR # BLD: 7.45 10*3/MM3 (ref 3.4–10.8)

## 2021-12-20 PROCEDURE — 90715 TDAP VACCINE 7 YRS/> IM: CPT | Performed by: FAMILY MEDICINE

## 2021-12-20 PROCEDURE — 83036 HEMOGLOBIN GLYCOSYLATED A1C: CPT | Performed by: FAMILY MEDICINE

## 2021-12-20 PROCEDURE — 80053 COMPREHEN METABOLIC PANEL: CPT | Performed by: FAMILY MEDICINE

## 2021-12-20 PROCEDURE — 90471 IMMUNIZATION ADMIN: CPT | Performed by: FAMILY MEDICINE

## 2021-12-20 PROCEDURE — 82570 ASSAY OF URINE CREATININE: CPT | Performed by: FAMILY MEDICINE

## 2021-12-20 PROCEDURE — 99214 OFFICE O/P EST MOD 30 MIN: CPT | Performed by: FAMILY MEDICINE

## 2021-12-20 PROCEDURE — 82043 UR ALBUMIN QUANTITATIVE: CPT | Performed by: FAMILY MEDICINE

## 2021-12-20 PROCEDURE — 85025 COMPLETE CBC W/AUTO DIFF WBC: CPT | Performed by: FAMILY MEDICINE

## 2021-12-20 PROCEDURE — 80061 LIPID PANEL: CPT | Performed by: FAMILY MEDICINE

## 2021-12-20 RX ORDER — AMLODIPINE BESYLATE 10 MG/1
10 TABLET ORAL DAILY
Qty: 90 TABLET | Refills: 3 | Status: SHIPPED | OUTPATIENT
Start: 2021-12-20 | End: 2022-03-18 | Stop reason: SDUPTHER

## 2021-12-20 NOTE — PROGRESS NOTES
Venipuncture Blood Specimen Collection  Venipuncture performed in left arm  by Kayleen Gregorio with good hemostasis. Patient tolerated the procedure well without complications.   12/20/21   Kayleen Gregorio

## 2021-12-20 NOTE — PROGRESS NOTES
Chief Complaint    Diabetes (follow up)    Subjective      Peter Crook presents to [facility]     History of Present Illness    1.) IDDM : Stable. No recent episodes of hypo- or hyperglycemia. Foot and eye exam up-to-date. No complaints of foot sxs. No signs of diabetic retinopathy during most recent eye exam.    2.) HTN : Stable.     Objective      Vital Signs:     /78   Pulse 77   Temp 97.3 °F (36.3 °C)   Wt 114 kg (251 lb)   SpO2 99%   BMI 30.55 kg/m²       Physical Exam  Vitals reviewed.   Constitutional:       General: He is not in acute distress.     Appearance: Normal appearance. He is well-developed.   HENT:      Head: Normocephalic and atraumatic.      Right Ear: Hearing and external ear normal.      Left Ear: Hearing and external ear normal.      Nose: Nose normal.   Eyes:      General: Lids are normal.         Right eye: No discharge.         Left eye: No discharge.      Conjunctiva/sclera: Conjunctivae normal.   Cardiovascular:      Rate and Rhythm: Rhythm irregular.   Pulmonary:      Effort: Pulmonary effort is normal. No respiratory distress.      Breath sounds: No stridor. No wheezing, rhonchi or rales.   Abdominal:      General: There is no distension.   Musculoskeletal:         General: No swelling.      Cervical back: Neck supple.   Skin:     Coloration: Skin is not jaundiced.      Findings: No erythema.   Neurological:      Mental Status: He is alert. Mental status is at baseline.   Psychiatric:         Mood and Affect: Mood and affect normal.         Thought Content: Thought content normal.     Assessment and Plan     Diagnoses and all orders for this visit:    1. Insulin dependent type 2 diabetes mellitus (HCC) (Primary)  Comments:  1.) Labs as noted. Continue current medication routine. Additional recommendations per review of labs.  Orders:  -     CBC and Differential  -     Comprehensive metabolic panel  -     Hemoglobin A1c  -     Lipid panel  -      Microalbumin / Creatinine Urine Ratio - Urine, Clean Catch    2. Need for vaccination  Comments:  1.) TDAP administered.   Orders:  -     Tdap Vaccine Greater Than or Equal To 6yo IM    3. Essential hypertension  Comments:  1.) Stable. Refill as noted.     Other orders  -     amLODIPine (NORVASC) 10 MG tablet; Take 1 tablet by mouth Daily.  Dispense: 90 tablet; Refill: 3    Follow Up     Return in about 6 months (around 6/20/2022) for diabetes, htn, etc.     Patient was given instructions and counseling regarding his condition or for health maintenance advice. Please see specific information pulled into the AVS if appropriate.

## 2021-12-29 ENCOUNTER — CLINICAL SUPPORT (OUTPATIENT)
Dept: UROLOGY | Facility: CLINIC | Age: 56
End: 2021-12-29

## 2021-12-29 DIAGNOSIS — C61 PROSTATE CANCER (HCC): Primary | ICD-10-CM

## 2021-12-29 PROCEDURE — 96402 CHEMO HORMON ANTINEOPL SQ/IM: CPT | Performed by: NURSE PRACTITIONER

## 2022-01-15 RX ORDER — INSULIN ASPART 100 [IU]/ML
INJECTION, SOLUTION INTRAVENOUS; SUBCUTANEOUS
Qty: 15 ML | Refills: 2 | Status: SHIPPED | OUTPATIENT
Start: 2022-01-15 | End: 2022-04-15 | Stop reason: SDUPTHER

## 2022-01-17 RX ORDER — INSULIN DETEMIR 100 [IU]/ML
INJECTION, SOLUTION SUBCUTANEOUS
Qty: 15 ML | Refills: 1 | Status: SHIPPED | OUTPATIENT
Start: 2022-01-17 | End: 2022-03-18 | Stop reason: SDUPTHER

## 2022-03-02 ENCOUNTER — OFFICE VISIT (OUTPATIENT)
Dept: SLEEP MEDICINE | Facility: HOSPITAL | Age: 57
End: 2022-03-02

## 2022-03-02 VITALS
DIASTOLIC BLOOD PRESSURE: 88 MMHG | HEART RATE: 66 BPM | OXYGEN SATURATION: 96 % | HEIGHT: 76 IN | SYSTOLIC BLOOD PRESSURE: 150 MMHG | TEMPERATURE: 96.9 F | BODY MASS INDEX: 30.44 KG/M2 | WEIGHT: 250 LBS

## 2022-03-02 DIAGNOSIS — G47.33 OSA ON CPAP: Primary | ICD-10-CM

## 2022-03-02 DIAGNOSIS — Z99.89 OSA ON CPAP: Primary | ICD-10-CM

## 2022-03-02 DIAGNOSIS — E66.9 CLASS 1 OBESITY: ICD-10-CM

## 2022-03-02 PROBLEM — E66.811 CLASS 1 OBESITY: Status: ACTIVE | Noted: 2022-03-02

## 2022-03-02 PROCEDURE — G0463 HOSPITAL OUTPT CLINIC VISIT: HCPCS | Performed by: INTERNAL MEDICINE

## 2022-03-02 PROCEDURE — 99213 OFFICE O/P EST LOW 20 MIN: CPT | Performed by: INTERNAL MEDICINE

## 2022-03-02 NOTE — PROGRESS NOTES
"  [address] [facility] [address]  Phone: [phone]  Fax: [phone]      SLEEP CLINIC FOLLOW UP PROGRESS NOTE.    Peter Crook  1965  57 y.o.  male      PCP: Jennifer Mckeon DO      Date of visit: 3/2/2022    Chief Complaint   Patient presents with   • Sleep Apnea   • Obesity       HPI:  This is a 57 y.o. years old patient is here for the management of obstructive sleep apnea.  Sleep apnea is mild in severity with a AHI of 9.3/hr. Patient is using positive airway pressure therapy with auto CPAP and the symptoms of snoring, non-restorative sleep and daytime excessive sleepiness have improved significantly on the therapy. Normally goes to bed at 8:30 PM and wakes up at 4:30 AM.  The patient wakes up 3 time(s) during the night and has no problem going back to sleep.  Feels refreshed after waking up.  Patient also denies headaches and nasal congestion.  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     SOCIAL (habits pertaining to sleep medicine)  History tobacco use:No   History of alcohol use: 0 per week  Caffeine use: 3     REVIEW OF SYSTEMS:   [place] Sleepiness Scale :Total score: 21   Nasal congestion:No   Dry mouth/nose:No   Post nasal drip; No   Acid reflux/Heartburn:No   Abd bloating:No   Morning headache:No   Anxiety:No   Depression:No    PHYSICAL EXAMINATION:  CONSTITUTIONAL:  Vitals:    03/02/22 1300   BP: 150/88   Pulse: 66   Temp: 96.9 °F (36.1 °C)   SpO2: 96%   Weight: 113 kg (250 lb)   Height: 193 cm (76\")    Body mass index is 30.43 kg/m².   NOSE: nasal passages are clear, No deformities noted   RESP SYSTEM: Not in any respiratory distress, no chest deformities noted,   CARDIOVASULAR: No edema noted  NEURO: Oriented x 3, gait normal,  Mood and affect appeared appropriate      Data reviewed:  The Smart card downloaded on 3/2/2022 has been " reviewed independently by me for compliance and discussed the data with the patient.   Compliance; 100%  More than 4 hr use, 100%  Average use of the device 7 hours and 7-minute per night  Residual AHI: Two-point /hr (goal < 5.0 /hr)  Mask type: Nasal pillow  Device: ResMed  DME: [employer]      ASSESSMENT AND PLAN:  · Obstructive sleep apnea ( G 47.33).  The symptoms of sleep apnea have improved with the device and the treatment.  Patient's compliance with the device is excellent for treatment of sleep apnea.  I have independently reviewed the smart card down load and discussed with the patient the download data and encouarged the patient to continue to use the device.The residual AHI is acceptable. The device is benefiting the patient and the device is medically necessary.  Without proper control of sleep apnea and good compliance there is a increased risk for hypertension, diabetes mellitus and nonrestorative sleep with hypersomnia which can increase risk for motor vehicle accidents.  Untreated sleep apnea is also a risk factor for development of atrial fibrillation, pulmonary hypertension and stroke. The patient is also instructed to get the supplies from the [facility] and and change them on a regular basis.  A prescription for supplies has been sent to the [facility].  I have also discussed the good sleep hygiene habits and adequate amount of sleep needed for good health.  · Obesity  1 with BMI is Body mass index is 30.43 kg/m².. I have discuss the relationship between the weight and sleep apnea. The benefit of weight loss in reducing severity of sleep apnea was discussed. Discussed diet and exercise with the patient to achieve ideal BMI.   · Return in about 1 year (around 3/2/2023) for Annual visit with smartcard download. . Patient's questions were answered.      Peter Rossi MD  Sleep Medicine.  Medical Director, [facility] sleep [facility]  3/2/2022 ,

## 2022-03-18 RX ORDER — INSULIN DETEMIR 100 [IU]/ML
25 INJECTION, SOLUTION SUBCUTANEOUS 2 TIMES DAILY
Qty: 15 PEN | Refills: 1 | Status: SHIPPED | OUTPATIENT
Start: 2022-03-18 | End: 2022-09-09 | Stop reason: SDUPTHER

## 2022-03-18 RX ORDER — VALSARTAN 320 MG/1
320 TABLET ORAL DAILY
Qty: 90 TABLET | Refills: 1 | Status: SHIPPED | OUTPATIENT
Start: 2022-03-18 | End: 2022-12-19 | Stop reason: SDUPTHER

## 2022-03-18 RX ORDER — CHLORTHALIDONE 25 MG/1
25 TABLET ORAL DAILY
Qty: 90 TABLET | Refills: 1 | Status: SHIPPED | OUTPATIENT
Start: 2022-03-18 | End: 2022-09-29 | Stop reason: SDUPTHER

## 2022-03-18 RX ORDER — AMLODIPINE BESYLATE 10 MG/1
10 TABLET ORAL DAILY
Qty: 90 TABLET | Refills: 3 | Status: SHIPPED | OUTPATIENT
Start: 2022-03-18 | End: 2023-02-06 | Stop reason: SDUPTHER

## 2022-03-28 ENCOUNTER — CLINICAL SUPPORT (OUTPATIENT)
Dept: UROLOGY | Facility: CLINIC | Age: 57
End: 2022-03-28

## 2022-03-28 DIAGNOSIS — C61 PROSTATE CANCER: Primary | ICD-10-CM

## 2022-03-28 PROCEDURE — 96402 CHEMO HORMON ANTINEOPL SQ/IM: CPT | Performed by: UROLOGY

## 2022-04-01 DIAGNOSIS — I48.20 CHRONIC ATRIAL FIBRILLATION: Primary | ICD-10-CM

## 2022-04-01 DIAGNOSIS — E11.9 INSULIN DEPENDENT TYPE 2 DIABETES MELLITUS: Primary | ICD-10-CM

## 2022-04-01 DIAGNOSIS — Z79.4 INSULIN DEPENDENT TYPE 2 DIABETES MELLITUS: Primary | ICD-10-CM

## 2022-04-13 ENCOUNTER — OFFICE VISIT (OUTPATIENT)
Dept: CARDIOLOGY | Facility: CLINIC | Age: 57
End: 2022-04-13

## 2022-04-13 VITALS
HEIGHT: 76 IN | SYSTOLIC BLOOD PRESSURE: 136 MMHG | DIASTOLIC BLOOD PRESSURE: 88 MMHG | WEIGHT: 256 LBS | BODY MASS INDEX: 31.17 KG/M2 | HEART RATE: 72 BPM

## 2022-04-13 DIAGNOSIS — I10 ESSENTIAL HYPERTENSION: ICD-10-CM

## 2022-04-13 DIAGNOSIS — I77.810 ASCENDING AORTA DILATATION: ICD-10-CM

## 2022-04-13 DIAGNOSIS — I48.0 PAROXYSMAL ATRIAL FIBRILLATION: Primary | ICD-10-CM

## 2022-04-13 PROCEDURE — 93000 ELECTROCARDIOGRAM COMPLETE: CPT | Performed by: INTERNAL MEDICINE

## 2022-04-13 PROCEDURE — 99214 OFFICE O/P EST MOD 30 MIN: CPT | Performed by: INTERNAL MEDICINE

## 2022-04-13 NOTE — ASSESSMENT & PLAN NOTE
He has very minimal palpitations, and mostly in normal sinus rhythm.  Will continue metoprolol 100 mg twice daily for rate and rhythm management.  He is also on Eliquis for anticoagulation.  He has no bleeding manifestations and he is tolerating the medication well.  We will proceed with an echocardiogram to reassess the LV function and rule out any significant valvular abnormalities.

## 2022-04-13 NOTE — ASSESSMENT & PLAN NOTE
Ascending aorta 4.1 cm per previous CT scan of the chest in 2020.  We will proceed with repeat CT chest without contrast to reassess size of ascending aorta and aortic root.

## 2022-04-13 NOTE — PROGRESS NOTES
CARDIOLOGY FOLLOW-UP PROGRESS NOTE        Chief Complaint  Atrial Fibrillation, Hypertension, and Follow-up    Subjective            Peter Crook presents to [facility] CARDIOLOGY  History of Present Illness      This is a 57-year-old male with paroxysmal atrial fibrillation, hypertension, hyperlipidemia and ectasia of the ascending aorta who is here to establish cardiac care.  He was noted to have intermittent approximately fibrillation for several years and is on anticoagulation with Eliquis.  He was previously [place] up [place].  He had admission to the hospital in September, 2021 and on telemetry he was noted to have intermittent episodes of A. fib, but he was mostly in sinus rhythm.  Today he reports that palpitations are rare and happens once or twice per week, mostly at night.  Each episode lasts for 5 to 10 minutes.  He denies any chest pain, shortness of breath, dizziness or syncopal episodes.  He has no bleeding manifestations and tolerating Eliquis well for the past several years.      Past History:    Medical History:  Past Medical History:   Diagnosis Date   • Adenocarcinoma of prostate (HCC)     negetive prostate biopsy march 2019    • Atrial fibrillation (HCC)    • Bladder diverticulum    • ED (erectile dysfunction)    • Hyperlipidemia    • Spermatocele        Surgical History: has a past surgical history that includes TURP / transurethral incision / drainage prostate (02/11/2020); Bladder diverticulectomy; Vasectomy; Appendectomy; Prostatectomy; Hydrocelectomy; and cystoscopy bladder biopsy (N/A, 10/15/2021).     Family History: family history includes Cancer in his father; Hypertension in his father; No Known Problems in his brother, cousin, daughter, maternal grandfather, maternal grandmother, mother, paternal grandfather, paternal grandmother, sister, son, and another family member.     Social History: reports that he has quit smoking. His  smoking use included cigarettes. He has a 32.00 pack-year smoking history. He has never used smokeless tobacco. He reports previous alcohol use. He reports that he does not use drugs.    Allergies: Patient has no known allergies.    Current Outpatient Medications on File Prior to Visit   Medication Sig   • amLODIPine (NORVASC) 10 MG tablet Take 1 tablet by mouth Daily.   • apixaban (ELIQUIS) 5 MG tablet tablet Take 1 tablet by mouth Every 12 (Twelve) Hours. Was instructed by Dr. Sykes to stop 10/11/21   • chlorthalidone (HYGROTON) 25 MG tablet Take 1 tablet by mouth Daily for 90 days.   • insulin aspart (NovoLOG) 100 UNIT/ML injection Inject 15 Units under the skin into the appropriate area as directed 3 (Three) Times a Day Before Meals.   • insulin detemir (Levemir FlexTouch) 100 UNIT/ML injection Inject 25 Units under the skin into the appropriate area as directed 2 (Two) Times a Day for 90 days.   • Insulin Pen Needle (Pen Needles) 31G X 5 MM misc Inject 1 each under the skin into the appropriate area as directed Daily With Breakfast, Lunch & Dinner.   • leuprolide (LUPRON) 22.5 MG injection Inject 22.5 mg into the appropriate muscle as directed by prescriber Every 3 (Three) Months.   • metoprolol succinate XL (Toprol XL) 200 MG 24 hr tablet Take 0.5 tablets by mouth 2 (two) times a day.   • Multiple Vitamins-Minerals (EMERGEN-C VITAMIN C PO) Take 1 tablet by mouth Daily.   • NovoLOG FlexPen 100 UNIT/ML solution pen-injector sc pen INJECT 15 UNITS UNDER THE SKIN 3 TIMES DAILY BEFORE MEALS   • potassium chloride ER (K-TAB) 20 MEQ tablet controlled-release ER tablet Take 20 mEq by mouth Daily.   • rosuvastatin (CRESTOR) 20 MG tablet Take 1 tablet by mouth Daily.   • valsartan (DIOVAN) 320 MG tablet Take 1 tablet by mouth Daily.   • [DISCONTINUED] Magnesium 400 MG tablet Take 400 mg by mouth Daily.     No current facility-administered medications on file prior to visit.          Review of Systems   Constitutional:  "Negative for fatigue, unexpected weight gain and unexpected weight loss.   Eyes: Negative for double vision.   Respiratory: Negative for cough, shortness of breath and wheezing.    Cardiovascular: Positive for palpitations. Negative for chest pain and leg swelling.   Gastrointestinal: Negative for abdominal pain, nausea and vomiting.   Endocrine: Negative for cold intolerance, heat intolerance, polydipsia and polyuria.   Musculoskeletal: Negative for arthralgias and back pain.   Skin: Negative for color change.   Neurological: Negative for dizziness, syncope, weakness and headache.   Hematological: Does not bruise/bleed easily.        Objective     /88   Pulse 72   Ht 193 cm (76\")   Wt 116 kg (256 lb)   BMI 31.16 kg/m²       Physical Exam  Constitutional:       General: He is awake. He is not in acute distress.     Appearance: Normal appearance.   Eyes:      Extraocular Movements: Extraocular movements intact.      Pupils: Pupils are equal, round, and reactive to light.   Neck:      Thyroid: No thyromegaly.      Vascular: No carotid bruit or JVD.   Cardiovascular:      Rate and Rhythm: Normal rate and regular rhythm.      Chest Wall: PMI is not displaced.      Heart sounds: Normal heart sounds, S1 normal and S2 normal. No murmur heard.    No friction rub. No gallop. No S3 or S4 sounds.   Pulmonary:      Effort: Pulmonary effort is normal. No respiratory distress.      Breath sounds: Normal breath sounds. No wheezing, rhonchi or rales.   Abdominal:      General: Bowel sounds are normal.      Palpations: Abdomen is soft.      Tenderness: There is no abdominal tenderness.   Musculoskeletal:      Cervical back: Neck supple.      Right lower leg: No edema.      Left lower leg: No edema.   Skin:     Nails: There is no clubbing.   Neurological:      General: No focal deficit present.      Mental Status: He is alert and oriented to person, place, and time.         General : Alert, awake, no acute distress  Neck : " Supple, no carotid bruit, no jugular venous distention  CVS : Regular rate and rhythm, no murmur, rubs or gallops  Lungs: Clear to auscultation bilaterally, no crackles or rhonchi  Abdomen: Soft, nontender, bowel sounds heard in all 4 quadrants  Extremities: Warm, well-perfused, no pedal edema    Result Review :     The following data was reviewed by: Neel Sullivan MD on 04/13/2022:    CMP    CMP 9/22/21 10/15/21 12/20/21   Glucose 340 (A) 132 (A) 122 (A)   BUN 16 28 (A) 20   Creatinine 0.88 0.91 0.80   eGFR Non African Am 90 86 100   Sodium 133 (A) 140 138   Potassium 3.9 3.7 3.9   Chloride 96 (A) 102 100   Calcium 9.6 9.6 10.5   Albumin  4.40 4.40   Total Bilirubin  0.7 0.4   Alkaline Phosphatase  83 99   AST (SGOT)  88 (A) 33   ALT (SGPT)  100 (A) 48 (A)   (A) Abnormal value            CBC    CBC 9/22/21 10/15/21 12/20/21   WBC 3.37 (A) 4.64 7.45   RBC 4.47 4.57 5.05   Hemoglobin 12.5 (A) 12.7 (A) 13.7   Hematocrit 37.3 (A) 37.7 41.9   MCV 83.4 82.5 83.0   MCH 28.0 27.8 27.1   MCHC 33.5 33.7 32.7   RDW 13.6 13.5 14.1   Platelets 137 (A) 171 222   (A) Abnormal value              Lipid Panel    Lipid Panel 9/27/21 12/20/21   Total Cholesterol 149 156   Triglycerides 190 (A) 209 (A)   HDL Cholesterol 32 (A) 42   VLDL Cholesterol 33 35   LDL Cholesterol  84 79   LDL/HDL Ratio 2.47 1.72   (A) Abnormal value                 Data reviewed: Cardiology studies      Echocardiogram done at outside hospital on 9/29/2020 showed     Left ventricular systolic function is normal.   Ejection Fraction = 60-65%.   There is diastolic dysfunction grade I (impaired LV relaxation).   There is mild concentric left ventricular hypertrophy.   The left atrium is mildly dilated.   The right atrium is mildly dilated.   Sinus of Valsalva is moderately dilated at 4.5cm   Asc Aorta is mildly dilated at 4.1cm                ECG 12 Lead    Date/Time: 4/13/2022 4:11 PM  Performed by: Neel Sullivan MD  Authorized by: Neel Sullivan MD    Comparison: compared with previous ECG from 9/20/2021  Similar to previous ECG  Rhythm: sinus rhythm  Rate: normal  Conduction: conduction normal  ST Segments: ST segments normal  QRS axis: normal  Other findings comments: Borderline T wave normalities noted  Clinical impression comment: Borderline ECG                  Assessment and Plan        Diagnoses and all orders for this visit:    1. Paroxysmal atrial fibrillation (HCC) (Primary)  Assessment & Plan:  He has very minimal palpitations, and mostly in normal sinus rhythm.  Will continue metoprolol 100 mg twice daily for rate and rhythm management.  He is also on Eliquis for anticoagulation.  He has no bleeding manifestations and he is tolerating the medication well.  We will proceed with an echocardiogram to reassess the LV function and rule out any significant valvular abnormalities.    Orders:  -     Adult Transthoracic Echo Complete W/ Cont if Necessary Per Protocol; Future    2. Ascending aorta dilatation (HCC)  Assessment & Plan:  Ascending aorta 4.1 cm per previous CT scan of the chest in 2020.  We will proceed with repeat CT chest without contrast to reassess size of ascending aorta and aortic root.    Orders:  -     CT Chest Without Contrast; Future    3. Essential hypertension  Assessment & Plan:  Blood pressure reasonably well controlled.  Continue amlodipine, chlorthalidone and metoprolol without changes.      Other orders  -     ECG 12 Lead            Follow Up     Return in about 6 years (around 4/13/2028) for Next scheduled follow up.    Patient was given instructions and counseling regarding his condition or for health maintenance advice. Please see specific information pulled into the AVS if appropriate.

## 2022-04-13 NOTE — ASSESSMENT & PLAN NOTE
Blood pressure reasonably well controlled.  Continue amlodipine, chlorthalidone and metoprolol without changes.

## 2022-04-15 ENCOUNTER — OFFICE VISIT (OUTPATIENT)
Dept: UROLOGY | Facility: CLINIC | Age: 57
End: 2022-04-15

## 2022-04-15 ENCOUNTER — LAB (OUTPATIENT)
Dept: LAB | Facility: HOSPITAL | Age: 57
End: 2022-04-15

## 2022-04-15 VITALS
TEMPERATURE: 98.6 F | HEART RATE: 76 BPM | WEIGHT: 256 LBS | DIASTOLIC BLOOD PRESSURE: 92 MMHG | HEIGHT: 76 IN | SYSTOLIC BLOOD PRESSURE: 136 MMHG | BODY MASS INDEX: 31.17 KG/M2

## 2022-04-15 DIAGNOSIS — C61 PROSTATE CANCER: ICD-10-CM

## 2022-04-15 DIAGNOSIS — N39.3 STRESS INCONTINENCE OF URINE: ICD-10-CM

## 2022-04-15 DIAGNOSIS — C61 PROSTATE CANCER: Primary | ICD-10-CM

## 2022-04-15 LAB
ALBUMIN SERPL-MCNC: 4.1 G/DL (ref 3.5–5.2)
ALBUMIN/GLOB SERPL: 1.4 G/DL
ALP SERPL-CCNC: 91 U/L (ref 39–117)
ALT SERPL W P-5'-P-CCNC: 52 U/L (ref 1–41)
ANION GAP SERPL CALCULATED.3IONS-SCNC: 11.5 MMOL/L (ref 5–15)
AST SERPL-CCNC: 28 U/L (ref 1–40)
BILIRUB BLD-MCNC: NEGATIVE MG/DL
BILIRUB SERPL-MCNC: 0.4 MG/DL (ref 0–1.2)
BUN SERPL-MCNC: 20 MG/DL (ref 6–20)
BUN/CREAT SERPL: 23.8 (ref 7–25)
CALCIUM SPEC-SCNC: 9.5 MG/DL (ref 8.6–10.5)
CHLORIDE SERPL-SCNC: 100 MMOL/L (ref 98–107)
CLARITY, POC: CLEAR
CO2 SERPL-SCNC: 25.5 MMOL/L (ref 22–29)
COLOR UR: YELLOW
CREAT SERPL-MCNC: 0.84 MG/DL (ref 0.76–1.27)
EGFRCR SERPLBLD CKD-EPI 2021: 102.3 ML/MIN/1.73
EXPIRATION DATE: NORMAL
GLOBULIN UR ELPH-MCNC: 3 GM/DL
GLUCOSE SERPL-MCNC: 105 MG/DL (ref 65–99)
GLUCOSE UR STRIP-MCNC: NEGATIVE MG/DL
KETONES UR QL: NEGATIVE
LEUKOCYTE EST, POC: NEGATIVE
Lab: NORMAL
NITRITE UR-MCNC: NEGATIVE MG/ML
PH UR: 7 [PH] (ref 5–8)
POTASSIUM SERPL-SCNC: 3.1 MMOL/L (ref 3.5–5.2)
PROT SERPL-MCNC: 7.1 G/DL (ref 6–8.5)
PROT UR STRIP-MCNC: NEGATIVE MG/DL
PSA SERPL-MCNC: <0.014 NG/ML (ref 0–4)
RBC # UR STRIP: NEGATIVE /UL
SODIUM SERPL-SCNC: 137 MMOL/L (ref 136–145)
SP GR UR: 1.02 (ref 1–1.03)
UROBILINOGEN UR QL: NORMAL

## 2022-04-15 PROCEDURE — 80053 COMPREHEN METABOLIC PANEL: CPT

## 2022-04-15 PROCEDURE — 99213 OFFICE O/P EST LOW 20 MIN: CPT | Performed by: UROLOGY

## 2022-04-15 PROCEDURE — 81003 URINALYSIS AUTO W/O SCOPE: CPT | Performed by: UROLOGY

## 2022-04-15 PROCEDURE — 36415 COLL VENOUS BLD VENIPUNCTURE: CPT

## 2022-04-15 PROCEDURE — 84153 ASSAY OF PSA TOTAL: CPT

## 2022-04-15 NOTE — PROGRESS NOTES
"Chief Complaint  Prostate Cancer    Subjective  No acute distress        Peter Crook presents to [facility] UROLOGY  History of Present Illness    Patient has prostate carcinoma and radical prostatectomy in 2020 and had positive lymph nodes in the pelvis.  Patient had radiation and currently is on Depo-Lupron only.  Patient was not given Casodex originally and was told that he can start Depo-Lupron after 2 years.  Obviously does not feel good about taking Depo-Lupron.    Patient had a lesion at the bladder neck and a biopsy was done and since that time he is totally incontinent.  He also has erectile dysfunction and request to have a sphincter and penile implant.  Patient has no nausea or  vomiting and has no back pain.  Patient is diabetic and is on insulin.    Objective No acute distress  Vital Signs:   /92   Pulse 76   Temp 98.6 °F (37 °C)   Ht 193 cm (76\")   Wt 116 kg (256 lb)   BMI 31.16 kg/m²     No Known Allergies   Past medical history:  has a past medical history of Adenocarcinoma of prostate (HCC), Atrial fibrillation (HCC), Bladder diverticulum, ED (erectile dysfunction), Hyperlipidemia, and Spermatocele.   Past surgical history:  has a past surgical history that includes TURP / transurethral incision / drainage prostate (02/11/2020); Bladder diverticulectomy; Vasectomy; Appendectomy; Prostatectomy; Hydrocelectomy; and cystoscopy bladder biopsy (N/A, 10/15/2021).  Personal history: family history includes Cancer in his father; Hypertension in his father; No Known Problems in his brother, cousin, daughter, maternal grandfather, maternal grandmother, mother, paternal grandfather, paternal grandmother, sister, son, and another family member.  Social history:  reports that he has quit smoking. His smoking use included cigarettes. He has a 32.00 pack-year smoking history. He has never used smokeless tobacco. He reports previous alcohol use. He reports that he does not use " drugs.    Review of Systems     Physical Exam  Constitutional:       General: He is not in acute distress.     Appearance: Normal appearance. He is normal weight. He is not ill-appearing or toxic-appearing.   HENT:      Head: Normocephalic and atraumatic.      Ears:      Comments: No hearing loss  Cardiovascular:      Rate and Rhythm: Normal rate and regular rhythm.      Pulses: Normal pulses.      Heart sounds: Normal heart sounds. No murmur heard.  Pulmonary:      Effort: Pulmonary effort is normal.      Breath sounds: Normal breath sounds. No rhonchi or rales.   Abdominal:      General: Abdomen is flat. Bowel sounds are normal.      Palpations: Abdomen is soft. There is no mass.      Tenderness: There is no abdominal tenderness. There is no right CVA tenderness or left CVA tenderness.   Genitourinary:     Rectum: Normal.      Comments: Patient is  incontinent and have a clamp on the penis.    Scrotum is normal.    Testicles are normal.    Prostate gland is surgically absent  Musculoskeletal:         General: No swelling. Normal range of motion.      Cervical back: Normal range of motion and neck supple. No rigidity or tenderness.   Lymphadenopathy:      Cervical: No cervical adenopathy.   Skin:     General: Skin is warm.      Coloration: Skin is not jaundiced.   Neurological:      General: No focal deficit present.      Mental Status: He is alert and oriented to person, place, and time.      Motor: No weakness.      Gait: Gait normal.   Psychiatric:         Mood and Affect: Mood normal.         Behavior: Behavior normal.         Thought Content: Thought content normal.         Judgment: Judgment normal.        Result Review :                 Assessment and Plan    Diagnoses and all orders for this visit:    1. Prostate cancer (HCC) (Primary)  -     Cancel: PSA Diagnostic; Future  -     POC Urinalysis Dipstick, Automated  -     PSA Diagnostic; Future  -     Comprehensive Metabolic Panel; Future    2. Stress  incontinence of urine      Patient is incontinent and request to have  sphincter and penile implant at the same time.  He is on Depo-Lupron have suggested him to start on androgen receptors.  Patient does not like being on Depo-Lupron because the way he feels he does not like.    I am going to do a PSA to see how is doing with his prostate cancer.  At this time his prostate cancer is in remission unless PSA shows something different.  His prognosis is good so long as his PSA remains stable and under 1 ng.  Is overs all survival is good so long as he continues Depo-Lupron and androgen receptors blockers.  We will refer him to Dr. Gonzalez for insertion of  sphincter and penile implant at the same time.  We will recheck him in 6 months time.  Brief Urine Lab Results  (Last result in the past 365 days)      Color   Clarity   Blood   Leuk Est   Nitrite   Protein   CREAT   Urine HCG        04/15/22 1024 Yellow   Clear   Negative   Negative   Negative   Negative                  Follow Up   No follow-ups on file.  Patient was given instructions and counseling regarding his condition or for health maintenance advice. Please see specific information pulled into the AVS if appropriate.     Filiberto Sykes MD

## 2022-04-18 DIAGNOSIS — N39.3 STRESS INCONTINENCE OF URINE: ICD-10-CM

## 2022-04-18 DIAGNOSIS — C61 PROSTATE CANCER: Primary | ICD-10-CM

## 2022-04-18 DIAGNOSIS — N52.9 ERECTILE DYSFUNCTION, UNSPECIFIED ERECTILE DYSFUNCTION TYPE: ICD-10-CM

## 2022-04-20 RX ORDER — INSULIN ASPART 100 [IU]/ML
INJECTION, SOLUTION INTRAVENOUS; SUBCUTANEOUS
Qty: 90 PEN | Refills: 2 | Status: SHIPPED | OUTPATIENT
Start: 2022-04-20 | End: 2022-04-25

## 2022-04-25 ENCOUNTER — OFFICE VISIT (OUTPATIENT)
Dept: ENDOCRINOLOGY | Age: 57
End: 2022-04-25

## 2022-04-25 VITALS
SYSTOLIC BLOOD PRESSURE: 130 MMHG | WEIGHT: 258 LBS | HEIGHT: 76 IN | HEART RATE: 72 BPM | DIASTOLIC BLOOD PRESSURE: 70 MMHG | BODY MASS INDEX: 31.42 KG/M2 | OXYGEN SATURATION: 97 %

## 2022-04-25 DIAGNOSIS — Z79.4 INSULIN DEPENDENT TYPE 2 DIABETES MELLITUS: Primary | ICD-10-CM

## 2022-04-25 DIAGNOSIS — E11.9 INSULIN DEPENDENT TYPE 2 DIABETES MELLITUS: Primary | ICD-10-CM

## 2022-04-25 DIAGNOSIS — E78.2 MIXED HYPERLIPIDEMIA: ICD-10-CM

## 2022-04-25 PROCEDURE — 99204 OFFICE O/P NEW MOD 45 MIN: CPT | Performed by: INTERNAL MEDICINE

## 2022-04-25 RX ORDER — IBUPROFEN 600 MG/1
1 TABLET ORAL DAILY PRN
Qty: 2 EACH | Refills: 5 | Status: SHIPPED | OUTPATIENT
Start: 2022-04-25

## 2022-04-25 NOTE — PATIENT INSTRUCTIONS
Home instructions 4/25/22  Diabetes medications    Measure glucose before each meal and bedtime  Low glucose is defined as less than 70 mg/DL  Premeal and fasting glucose goals  mg/DL  2-hour after meal goals 140 to 180 mg/DL  Take   Levemir 25 units every morning  Take Levemir 25 units every evening, 12 hours later  Note: If a.m. glucose is less than 80 or overnight glucose is less than 100 then decrease Levemir by 10%    Mealtime       Novolog 10 units daily [ may return to 15 units if the glucose start rising to 170 or greater after meals.   Total carbohydrates:[Minus dietary fiber.]  1 serving equal 15 g (about half a cup)  2 servings equal 30 g (about 1 cup)  3 servings equals 45 g  4 servings equal 60 g  5 servings equals 75 g per meal  Take Humalog, lispro/NovoLog,/ Novolin R /aspart per scale, 15 minutes before meals  Add plus scale as follows    If blood glucose is above 140 then add the following dose of Humalog, lispro/NovoLog, aspart to mealtime insulin [1: 30]    140-170 0 unit  171-200 2 units  201-230 3 units  231-260 4 units  261-290 5 units  291-320 6 units  321-350 7 units  351-380 8 units  381-or greater 9 units    Sick day rules: If glucose is over 250 mg/DL then check glucose every 2 hours and treat with correction scale above.    Example at breakfast take mealtime insulin as above and if glucose 268 then [per scale above] add 6 units.   Take total dose 15 minutes before meal. If at a restaurant, unsure when meal is coming then may take mealtime insulin immediately after the last bite of food.  -------------------------------------------------------------------------------------------------------------------------    Using cell phone or computer Mealtime i may also  be calculated     Calorie [employer] applications.      Meal planning  25 g plan  Meals 3 meals and 3 snacks  Please limit meals to 75 g (5 servings)  Please limit snacks to 30 g (2 serving)    Counting carbohydrates use phone charline  calorie [email or web address]  1 serving equal 15 g (about half a cup)  2 servings equal 30 g (about 1 cup)  3 servings equals 45 g  4 servings equal 60 g  5 servings ago 75 g  6 servings equals 90 g    Breakfast example (5 servings of carbohydrates)    A.  Half a cup cereal (1 servings) and 1 cup milk (1 serving) and 1 fruit/half a cup fruit juice (1 serving), 1 slice of bread with peanut butter (1 serving) = 5 servings carbohydrate    B.  Eggs plus henley plus sausage plus cheese with 2 slices of bread (2 servings) and one yogurt (1 serving )and one fruit (1 serving) and half a cup hash brown potatoes (1 serving) =5 servings carbohydrate    Lunch or dinner example  A.  [place] equals 2 slices of bread (2 servings) and 1 fruit (1 serving), half a cup of potato salad (1 serving) and half a cup of ice cream (1 serving ) = 5 servings carbohydrates    B.  Any meat/chicken/fish and 2 cups Pasta (4 servings) and 1 slice of bread (1 serving)  = 5 servings carbohydrate    C.  Any meat chicken fish 1 cup of rice (3 servings), 1 fruit (1 serving) = 4 servings of carbohydrate    May add vegetables/salads to any meal for free    Except the following are 1 serving of carbohydrates    A.  Lima beans half a cup (1 serving)  B.  1 cup kidney/thomas beans (1 serving 30 g -14 g fiber equals 16 g)  C.  Half a cup of corn (1 serving)  D.  Half cup of peas (1 serving)  E.  Half a cup of potato, cubes (medium potato) (1 serving)  F  Sweets: Prep packaged 3 ounce ice cream cup, Moore brand/Nestlé 1 serving (15 g carbohydrates)  Notes: Fruits: 1 fruit about the size for lady's visit is 1 serving of carbohydrate  Half a cup fruit juice 1 serving    Snack examples (1 serving carbohydrate/15 g]----in your case you can double the servings as you are allowed to servings or 30 g of carbohydrates.    1.  No carbohydrates (0 servings):  Raw celery, radishes, bell pepper, banana peppers, corn, carrots, cucumbers dipped in salad dressings, guacamole, obese  seasoning, peanut butter, almond butter, salsa    2.  15 g (1 serving): Half a sandwich: Turkey and cheese, smoked salmon and cream cheese, peanut butter and banana slices, cheese and crackers, peanut butter and crackers, half a bagel or half an English muffin or 1 cup of fruit plus cottage cheese or 1 rice cake with sugar-free fruit spreading    3.  15 g or less (1 serving): Protein shakes: Ensure DM, Glucerna, boost protein: Wings and salad dressing, 1 taco    4.  Fruit, 1 servin apple/orange/8 large grapes/peach/a protein/fat (avocado/nuts/meat/ cottage cheese)      Hypercholesterolemia  meal planning  Nutrition counseling  1. Improve glucose control  2. Limit saturated fats to less than 30% at each meal [Use Chai arreola to read product labels]  3. Limit or avoid the following fats: Henley, butter, lard, coconut. Substitute with turkey henley, other vegetable oils, margarine  Limit or avoid marbling/fat on beef. Avoid hamburgers. Substitute with turkey/chicken/venison/vegetarians/salmon burgers.  Limit dairy: Milk cheese butter.  Limit eggs to 3 a week. Substitute with unlimited egg whites.  Limit bagels, biscuits, croissants: Substitute with wheat bread, English muffin, lázaro pockets, tortilla

## 2022-04-25 NOTE — PROGRESS NOTES
Chief Complaint  Diabetes    Subjective          Peter Crook presents to [facility]  History of Present Illness  57-year-old male  presents with new onset type 2 diabetes September 2017.  Since then hemoglobin A1c has improved from 12.20% to 6.19%.  He presents today in consultation as requested by Jennifer Mckeon DO.  Comorbidities: BMI 31.40, former nicotine habit of 2 packs/day for 16 years, atrial fibrillation on eliquis, no strokes, no CVA, hyperlipidemia controlled, hypertension trolled.  No symptoms of neuropathy, no retinopathy    Last dilated eye exam October 2021-no retinopathy noted [facility] in [facility].    Hospitalization [facility] for prostate cancer cystoscopy bladder biopsy on 10/15/2021 4 hours  He was hospitalized at [facility] In late Sept. For 1 week and met with nutritionist.   Current diabetes medications  Left Levemir 25 units 2 times daily  NovoLog 15 units AC 3 times daily    No hypoglycemia. Glucose 105-130mg/ Lowest 95.    Current Outpatient Medications on File Prior to Visit   Medication Sig Dispense Refill   • amLODIPine (NORVASC) 10 MG tablet Take 1 tablet by mouth Daily. 90 tablet 3   • apixaban (ELIQUIS) 5 MG tablet tablet Take 1 tablet by mouth Every 12 (Twelve) Hours. Was instructed by Dr. Sykes to stop 10/11/21 180 tablet 1   • chlorthalidone (HYGROTON) 25 MG tablet Take 1 tablet by mouth Daily for 90 days. 90 tablet 1   • insulin aspart (NovoLOG) 100 UNIT/ML injection Inject 15 Units under the skin into the appropriate area as directed 3 (Three) Times a Day Before Meals. 10 each 0   • insulin detemir (Levemir FlexTouch) 100 UNIT/ML injection Inject 25 Units under the skin into the appropriate area as directed 2 (Two) Times a Day for 90 days. 15 pen 1   • Insulin Pen Needle (Pen Needles) 31G X 5 MM misc Inject 1 each under the skin into the appropriate area as directed Daily With Breakfast, Lunch & Dinner. 100 each 1   •  leuprolide (LUPRON) 22.5 MG injection Inject 22.5 mg into the appropriate muscle as directed by prescriber Every 3 (Three) Months.     • metoprolol succinate XL (Toprol XL) 200 MG 24 hr tablet Take 0.5 tablets by mouth 2 (two) times a day. 180 tablet 1   • Multiple Vitamins-Minerals (EMERGEN-C VITAMIN C PO) Take 1 tablet by mouth Daily.     • potassium chloride ER (K-TAB) 20 MEQ tablet controlled-release ER tablet Take 20 mEq by mouth Daily.     • rosuvastatin (CRESTOR) 20 MG tablet Take 1 tablet by mouth Daily. 90 tablet 1   • valsartan (DIOVAN) 320 MG tablet Take 1 tablet by mouth Daily. 90 tablet 1   • [DISCONTINUED] NovoLOG FlexPen 100 UNIT/ML solution pen-injector sc pen INJECT 15 UNITS UNDER THE SKIN 3 TIMES DAILY BEFORE MEALS 90 pen 2     No current facility-administered medications on file prior to visit.     Past Medical History:   Diagnosis Date   • Adenocarcinoma of prostate (HCC)     negetive prostate biopsy march 2019    • Atrial fibrillation (HCC)    • Bladder diverticulum    • ED (erectile dysfunction)    • Hyperlipidemia    • Spermatocele      Past Surgical History:   Procedure Laterality Date   • APPENDECTOMY     • BLADDER DIVERTICULECTOMY      july 2020    • CYSTOSCOPY BLADDER BIOPSY N/A 10/15/2021    Procedure: CYSTOSCOPY;  BLADDER NECK BIOPSY; FULGURATION OF BLADDER NECK;  Surgeon: Filiberto Sykes MD;  Location: [facility];  Service: Urology;  Laterality: N/A;   • HYDROCELECTOMY     • PROSTATECTOMY     • TURP / TRANSURETHRAL INCISION / DRAINAGE PROSTATE  02/11/2020   • VASECTOMY       Social History     Socioeconomic History   • Marital status:    Tobacco Use   • Smoking status: Former Smoker     Packs/day: 2.00     Years: 16.00     Pack years: 32.00     Types: Cigarettes   • Smokeless tobacco: Never Used   Vaping Use   • Vaping Use: Never used   Substance and Sexual Activity   • Alcohol use: Not Currently   • Drug use: Never   • Sexual activity: Defer     Objective   Vital  "Signs:   /70   Pulse 72   Ht 193 cm (76\")   Wt 117 kg (258 lb)   SpO2 97%   BMI 31.40 kg/m²            Physical Exam  Vitals and nursing note reviewed.   HENT:      Head: Normocephalic.      Mouth/Throat:      Mouth: Mucous membranes are moist.   Cardiovascular:      Rate and Rhythm: Normal rate.      Pulses: Normal pulses.           Dorsalis pedis pulses are 2+ on the right side and 2+ on the left side.        Posterior tibial pulses are 2+ on the right side and 2+ on the left side.      Heart sounds: Normal heart sounds.      Comments: No afib at this visit  Pulmonary:      Effort: Pulmonary effort is normal.      Breath sounds: Normal breath sounds. No wheezing or rhonchi.   Abdominal:      General: Bowel sounds are normal.      Palpations: Abdomen is soft.   Musculoskeletal:         General: No swelling. Normal range of motion.      Cervical back: Normal range of motion and neck supple.   Feet:      Right foot:      Protective Sensation: 10 sites tested. 10 sites sensed.      Skin integrity: Skin integrity normal.      Toenail Condition: Right toenails are normal.      Left foot:      Protective Sensation: 10 sites tested. 10 sites sensed.      Skin integrity: Skin integrity normal.      Toenail Condition: Left toenails are normal.   Skin:     General: Skin is warm and dry.      Comments: Reviewed rotation of sites away from umbilicus throughout the remainder of the abdomen and posterior upper arms and anterior upper legs and posterior trunk overlying the buttocks area.   Neurological:      Mental Status: He is alert and oriented to person, place, and time. Mental status is at baseline.   Psychiatric:         Mood and Affect: Mood normal.         Behavior: Behavior normal.         Judgment: Judgment normal.        Result Review :   The following data was reviewed by: Brigitte Ralph MD on 04/25/2022:      Result Notes     1 Follow-up Encounter     1 HM Topic    Component   Ref Range & Units 4 " mo ago 7 mo ago   Hemoglobin A1C   4.80 - 5.60 % 6.19 High   12.20 High     Resulting [facility]        Component   Ref Range & Units 4 mo ago 7 mo ago   Total Cholesterol   0 - 200 mg/dL 156  149    Triglycerides   0 - 150 mg/dL 209 High   190 High     HDL Cholesterol   40 - 60 mg/dL 42  32 Low     LDL Cholesterol    0 - 100 mg/dL 79  84    VLDL Cholesterol   5 - 40 mg/dL 35  33    LDL/HDL Ratio  1.72  2.47    [facility]ing [facility]                Assessment and Plan    57-year-old male  presents with new onset type 2 diabetes September 2017.  Since then hemoglobin A1c has improved from 12.20% to 6.19%.  He presents today in consultation as requested by Jennifer Mckeon DO.  Comorbidities: BMI 31.40, former nicotine habit of 2 packs/day for 16 years, atrial fibrillation on eliquis, no strokes, no CVA, hyperlipidemia controlled, hypertension trolled.  No symptoms of neuropathy, no retinopathy    Type 2 diabetes currently controlled within goal hemoglobin A1c 6-7% without hypoglycemia.    Last dilated eye exam October 2021-no retinopathy noted [facility] in [facility].  Annual dilated eye exam recommended.    No hypoglycemia on insulin therapy  Hyperglycemia markedly improved on current insulin therapy    Will adjust mealtime and given recommendation to decrease as needed basal dose in case of starting to have any overnight hypoglycemia between this and next visit and mealtime hyperglycemia between this and next visit.  There are no diagnoses linked to this encounter.     Home instructions 4/25/22  Diabetes medications    Measure glucose before each meal and bedtime  Low glucose is defined as less than 70 mg/DL  Premeal and fasting glucose goals  mg/DL  2-hour after meal goals 140 to 180 mg/DL  Take   Levemir 25 units every morning  Take Levemir 25 units every evening, 12 hours later  Note: If a.m. glucose is less than 80 or overnight glucose is less than 100  then decrease Levemir by 10%    Mealtime       Novolog 10 units daily [ may return to 15 units if the glucose start rising to 170 or greater after meals.   Total carbohydrates:[Minus dietary fiber.]  1 serving equal 15 g (about half a cup)  2 servings equal 30 g (about 1 cup)  3 servings equals 45 g  4 servings equal 60 g  5 servings equals 75 g per meal  Take Humalog, lispro/NovoLog,/ Novolin R /aspart per scale, 15 minutes before meals  Add plus scale as follows    If blood glucose is above 140 then add the following dose of Humalog, lispro/NovoLog, aspart to mealtime insulin [1: 30]    140-170 0 unit  171-200 2 units  201-230 3 units  231-260 4 units  261-290 5 units  291-320 6 units  321-350 7 units  351-380 8 units  381-or greater 9 units    Sick day rules: If glucose is over 250 mg/DL then check glucose every 2 hours and treat with correction scale above.    Example at breakfast take mealtime insulin as above and if glucose 268 then [per scale above] add 6 units.   Take total dose 15 minutes before meal. If at a restaurant, unsure when meal is coming then may take mealtime insulin immediately after the last bite of food.  -------------------------------------------------------------------------------------------------------------------------    Using cell phone or computer Mealtime i may also  be calculated     Calorie [employer] applications.      Meal planning  25 g plan  Meals 3 meals and 3 snacks  Please limit meals to 75 g (5 servings)  Please limit snacks to 30 g (2 serving)    Counting carbohydrates use phone charline calorie [email or web address]  1 serving equal 15 g (about half a cup)  2 servings equal 30 g (about 1 cup)  3 servings equals 45 g  4 servings equal 60 g  5 servings ago 75 g  6 servings equals 90 g    Breakfast example (5 servings of carbohydrates)    A.  Half a cup cereal (1 servings) and 1 cup milk (1 serving) and 1 fruit/half a cup fruit juice (1 serving), 1 slice of bread with peanut butter (1 serving) = 5  servings carbohydrate    B.  Eggs plus henley plus sausage plus cheese with 2 slices of bread (2 servings) and one yogurt (1 serving )and one fruit (1 serving) and half a cup hash brown potatoes (1 serving) =5 servings carbohydrate    Lunch or dinner example  A.  [place] equals 2 slices of bread (2 servings) and 1 fruit (1 serving), half a cup of potato salad (1 serving) and half a cup of ice cream (1 serving ) = 5 servings carbohydrates    B.  Any meat/chicken/fish and 2 cups Pasta (4 servings) and 1 slice of bread (1 serving)  = 5 servings carbohydrate    C.  Any meat chicken fish 1 cup of rice (3 servings), 1 fruit (1 serving) = 4 servings of carbohydrate    May add vegetables/salads to any meal for free    Except the following are 1 serving of carbohydrates    A.  Lima beans half a cup (1 serving)  B.  1 cup kidney/thomas beans (1 serving 30 g -14 g fiber equals 16 g)  C.  Half a cup of corn (1 serving)  D.  Half cup of peas (1 serving)  E.  Half a cup of potato, cubes (medium potato) (1 serving)  F  Sweets: Prep packaged 3 ounce ice cream cup, Moore brand/Nestlé 1 serving (15 g carbohydrates)  Notes: Fruits: 1 fruit about the size for lady's visit is 1 serving of carbohydrate  Half a cup fruit juice 1 serving    Snack examples (1 serving carbohydrate/15 g]----in your case you can double the servings as you are allowed to servings or 30 g of carbohydrates.    1.  No carbohydrates (0 servings):  Raw celery, radishes, bell pepper, banana peppers, corn, carrots, cucumbers dipped in salad dressings, guacamole, obese seasoning, peanut butter, almond butter, salsa    2.  15 g (1 serving): Half a sandwich: Turkey and cheese, smoked salmon and cream cheese, peanut butter and banana slices, cheese and crackers, peanut butter and crackers, half a bagel or half an English muffin or 1 cup of fruit plus cottage cheese or 1 rice cake with sugar-free fruit spreading    3.  15 g or less (1 serving): Protein shakes: Ensure DM,  Glucerna, boost protein: Wings and salad dressing, 1 taco    4.  Fruit, 1 servin apple/orange/8 large grapes/peach/a protein/fat (avocado/nuts/meat/ cottage cheese)      Hypercholesterolemia  meal planning  Nutrition counseling  1. Improve glucose control  2. Limit saturated fats to less than 30% at each meal [Use Chai arreola to read product labels]  3. Limit or avoid the following fats: Henley, butter, lard, coconut. Substitute with turkey henley, other vegetable oils, margarine  Limit or avoid marbling/fat on beef. Avoid hamburgers. Substitute with turkey/chicken/venison/vegetarians/salmon burgers.  Limit dairy: Milk cheese butter.  Limit eggs to 3 a week. Substitute with unlimited egg whites.  Limit bagels, biscuits, croissants: Substitute with wheat bread, English muffin, lázaro pockets, tortilla     Follow Up   No follow-ups on file.  Patient was given instructions and counseling regarding his condition or for health maintenance advice. Please see specific information pulled into the AVS if appropriate.

## 2022-04-26 ENCOUNTER — PATIENT MESSAGE (OUTPATIENT)
Dept: ENDOCRINOLOGY | Age: 57
End: 2022-04-26

## 2022-04-27 NOTE — TELEPHONE ENCOUNTER
From: Peter Crook  To: Brigitte Ralph MD  Sent: 4/26/2022 7:30 PM EDT  Subject: Glucose Meter Report    Dr. Dawn,    Please find attached the report from my glucose meter.    Thank you.

## 2022-05-07 RX ORDER — APIXABAN 5 MG/1
TABLET, FILM COATED ORAL
Qty: 60 TABLET | Refills: 5 | OUTPATIENT
Start: 2022-05-07

## 2022-05-09 ENCOUNTER — HOSPITAL ENCOUNTER (OUTPATIENT)
Dept: CT IMAGING | Facility: HOSPITAL | Age: 57
Discharge: HOME | End: 2022-05-09

## 2022-05-09 ENCOUNTER — HOSPITAL ENCOUNTER (OUTPATIENT)
Dept: CARDIOLOGY | Facility: HOSPITAL | Age: 57
Discharge: HOME | End: 2022-05-09

## 2022-05-09 DIAGNOSIS — I48.0 PAROXYSMAL ATRIAL FIBRILLATION: ICD-10-CM

## 2022-05-09 DIAGNOSIS — I77.810 ASCENDING AORTA DILATATION: ICD-10-CM

## 2022-05-09 PROCEDURE — 71250 CT THORAX DX C-: CPT

## 2022-05-09 PROCEDURE — 93306 TTE W/DOPPLER COMPLETE: CPT

## 2022-05-09 PROCEDURE — 93306 TTE W/DOPPLER COMPLETE: CPT | Performed by: INTERNAL MEDICINE

## 2022-05-12 LAB
BH CV ECHO MEAS - AO ROOT DIAM: 3.8 CM
BH CV ECHO MEAS - EF(MOD-BP): 55.5 %
BH CV ECHO MEAS - IVSD: 1.5 CM
BH CV ECHO MEAS - LA DIMENSION(2D): 4.3 CM
BH CV ECHO MEAS - LAT PEAK E' VEL: 11 CM/SEC
BH CV ECHO MEAS - LVIDD: 5.4 CM
BH CV ECHO MEAS - LVIDS: 3.4 CM
BH CV ECHO MEAS - LVPWD: 0.9 CM
BH CV ECHO MEAS - MED PEAK E' VEL: 8.59 CM/SEC
BH CV ECHO MEAS - MV A MAX VEL: 84.9 CM/SEC
BH CV ECHO MEAS - MV DEC TIME: 254 MSEC
BH CV ECHO MEAS - MV E MAX VEL: 87 CM/SEC
BH CV ECHO MEAS - MV E/A: 1
BH CV ECHO MEAS - TAPSE (>1.6): 2.81 CM
BH CV ECHO MEASUREMENTS AVERAGE E/E' RATIO: 8.88
IVRT: 74 MSEC
LEFT ATRIUM VOLUME INDEX: 20.2 ML/M2
MAXIMAL PREDICTED HEART RATE: 164 BPM
STRESS TARGET HR: 139 BPM

## 2022-05-12 NOTE — PROGRESS NOTES
Echo shows normal heart function.  There are no significant valvular abnormalities.  Essentially normal study.    Follow-up as scheduled earlier, continue current medications      Electronically signed by Neel Sullivan MD, 05/12/22, 7:07 PM EDT.

## 2022-05-15 NOTE — PROGRESS NOTES
Size of the aneurysm 3.9 cm from current CT scan previously 4.0 cm.  No significant changes.  We will repeat the CT scan in the next 3 years.    Follow-up as scheduled earlier.

## 2022-06-21 ENCOUNTER — TRANSCRIBE ORDERS (OUTPATIENT)
Dept: ADMINISTRATIVE | Facility: HOSPITAL | Age: 57
End: 2022-06-21

## 2022-06-21 DIAGNOSIS — C61 PROSTATE CANCER: Primary | ICD-10-CM

## 2022-07-06 ENCOUNTER — TRANSCRIBE ORDERS (OUTPATIENT)
Dept: ADMINISTRATIVE | Facility: HOSPITAL | Age: 57
End: 2022-07-06

## 2022-07-06 DIAGNOSIS — C61 PROSTATE CANCER: Primary | ICD-10-CM

## 2022-07-13 ENCOUNTER — APPOINTMENT (OUTPATIENT)
Dept: NUCLEAR MEDICINE | Facility: HOSPITAL | Age: 57
End: 2022-07-13

## 2022-07-13 ENCOUNTER — TELEPHONE (OUTPATIENT)
Dept: CARDIOLOGY | Facility: CLINIC | Age: 57
End: 2022-07-13

## 2022-07-13 ENCOUNTER — HOSPITAL ENCOUNTER (OUTPATIENT)
Dept: NUCLEAR MEDICINE | Facility: HOSPITAL | Age: 57
Discharge: HOME | End: 2022-07-13

## 2022-07-13 DIAGNOSIS — C61 PROSTATE CANCER: ICD-10-CM

## 2022-07-13 PROCEDURE — 78306 BONE IMAGING WHOLE BODY: CPT

## 2022-07-13 PROCEDURE — 0 TECHNETIUM MEDRONATE KIT: Performed by: NURSE PRACTITIONER

## 2022-07-13 PROCEDURE — A9503 TC99M MEDRONATE: HCPCS | Performed by: NURSE PRACTITIONER

## 2022-07-13 RX ORDER — TC 99M MEDRONATE 20 MG/10ML
21.3 INJECTION, POWDER, LYOPHILIZED, FOR SOLUTION INTRAVENOUS
Status: COMPLETED | OUTPATIENT
Start: 2022-07-13 | End: 2022-07-13

## 2022-07-13 RX ADMIN — Medication 21.3 MILLICURIE: at 11:15

## 2022-07-13 NOTE — TELEPHONE ENCOUNTER
Procedure: Penile Prothesis and Artificial Spincter    Med Directive: Eliquis    PMH: Afib; HTN    Last Seen: 04/13/2022    Labs (07/12/2022): cr 0.72

## 2022-07-14 ENCOUNTER — APPOINTMENT (OUTPATIENT)
Dept: PET IMAGING | Facility: HOSPITAL | Age: 57
End: 2022-07-14

## 2022-07-14 ENCOUNTER — HOSPITAL ENCOUNTER (OUTPATIENT)
Dept: PET IMAGING | Facility: HOSPITAL | Age: 57
End: 2022-07-14

## 2022-07-14 NOTE — TELEPHONE ENCOUNTER
Mr. Crook may proceed with penile surgery as planned.  No further preoperative cardiac work-up required before anticipated surgery.  He will be at moderate risk for perioperative cardiac events.    Eliquis may be held for 2 days before the procedure to minimize the bleeding risks.      Electronically signed by Neel Sullivan MD, 07/14/22, 1:09 PM EDT.

## 2022-07-28 ENCOUNTER — LAB (OUTPATIENT)
Dept: LAB | Facility: HOSPITAL | Age: 57
End: 2022-07-28

## 2022-07-28 PROCEDURE — 82043 UR ALBUMIN QUANTITATIVE: CPT | Performed by: INTERNAL MEDICINE

## 2022-07-28 PROCEDURE — 83036 HEMOGLOBIN GLYCOSYLATED A1C: CPT | Performed by: INTERNAL MEDICINE

## 2022-07-28 PROCEDURE — 80053 COMPREHEN METABOLIC PANEL: CPT | Performed by: INTERNAL MEDICINE

## 2022-07-28 PROCEDURE — 84681 ASSAY OF C-PEPTIDE: CPT | Performed by: INTERNAL MEDICINE

## 2022-07-28 PROCEDURE — 80061 LIPID PANEL: CPT | Performed by: INTERNAL MEDICINE

## 2022-07-28 PROCEDURE — 82570 ASSAY OF URINE CREATININE: CPT | Performed by: INTERNAL MEDICINE

## 2022-07-28 PROCEDURE — 84443 ASSAY THYROID STIM HORMONE: CPT | Performed by: INTERNAL MEDICINE

## 2022-08-01 ENCOUNTER — OFFICE VISIT (OUTPATIENT)
Dept: ENDOCRINOLOGY | Age: 57
End: 2022-08-01

## 2022-08-01 VITALS
OXYGEN SATURATION: 99 % | SYSTOLIC BLOOD PRESSURE: 130 MMHG | WEIGHT: 252.4 LBS | TEMPERATURE: 97.6 F | HEIGHT: 76 IN | BODY MASS INDEX: 30.74 KG/M2 | HEART RATE: 99 BPM | DIASTOLIC BLOOD PRESSURE: 62 MMHG

## 2022-08-01 DIAGNOSIS — E11.9 INSULIN DEPENDENT TYPE 2 DIABETES MELLITUS: Primary | ICD-10-CM

## 2022-08-01 DIAGNOSIS — Z79.4 INSULIN DEPENDENT TYPE 2 DIABETES MELLITUS: Primary | ICD-10-CM

## 2022-08-01 LAB — GLUCOSE BLDC GLUCOMTR-MCNC: 137 MG/DL (ref 70–130)

## 2022-08-01 PROCEDURE — 82962 GLUCOSE BLOOD TEST: CPT | Performed by: INTERNAL MEDICINE

## 2022-08-01 PROCEDURE — 99213 OFFICE O/P EST LOW 20 MIN: CPT | Performed by: INTERNAL MEDICINE

## 2022-08-01 RX ORDER — HYDROCODONE BITARTRATE AND ACETAMINOPHEN 5; 325 MG/1; MG/1
1 TABLET ORAL EVERY 6 HOURS PRN
COMMUNITY
End: 2022-11-09 | Stop reason: ALTCHOICE

## 2022-08-01 NOTE — PROGRESS NOTES
Chief Complaint  Diabetes (Type 2)    Subjective          Peter Crook presents to [facility]  History of Present Illness  57-year-old male  presents with new onset type 2 diabetes September 2017.  Since then hemoglobin A1c has improved from 12.20% to 6.19%.  He presents today in consultation as requested by Jennifer Mckeon DO.  Comorbidities: BMI 31.40, former nicotine habit of 2 packs/day for 16 years, atrial fibrillation on eliquis, no strokes, no CVA, hyperlipidemia controlled, hypertension trolled.  No symptoms of neuropathy, no retinopathy    Last dilated eye exam October 2021-no retinopathy noted [facility] in [facility].    Hospitalization [facility] for prostate cancer cystoscopy bladder biopsy on 10/15/2021 4 hours  He was hospitalized at [facility] In late Sept. For 1 week and met with nutritionist.   Current diabetes medications  Measure glucose before each meal and bedtime  Low glucose is defined as less than 70 mg/DL  Premeal and fasting glucose goals  mg/DL  2-hour after meal goals 140 to 180 mg/DL  Take   Levemir 25 units every morning  Take Levemir 25 units every evening, 12 hours later  Note: If a.m. glucose is less than 80 or overnight glucose is less than 100 then decrease Levemir by 10%    Mealtime       Novolog 10 units daily [ may return to 15 units if the glucose start rising to 170 or greater after meals.   Total carbohydrates:[Minus dietary fiber.]  1 serving equal 15 g (about half a cup)  2 servings equal 30 g (about 1 cup)  3 servings equals 45 g  4 servings equal 60 g  5 servings equals 75 g per meal  Take Humalog, lispro/NovoLog,/ Novolin R /aspart per scale, 15 minutes before meals  Add plus scale as follows    If blood glucose is above 140 then add the following dose of Humalog, lispro/NovoLog, aspart to mealtime insulin [1: 30]    140-170 0 unit  171-200 2 units  201-230 3 units  231-260 4 units  261-290 5 units  291-320 6  units  321-350 7 units  351-380 8 units  381-or greater 9 units    No hypoglycemia. Glucose 105-130mg/ Lowest 95.    Current Outpatient Medications on File Prior to Visit   Medication Sig Dispense Refill   • amLODIPine (NORVASC) 10 MG tablet Take 1 tablet by mouth Daily. 90 tablet 3   • apixaban (ELIQUIS) 5 MG tablet tablet Take 1 tablet by mouth Every 12 (Twelve) Hours. Was instructed by Dr. Sykes to stop 10/11/21 180 tablet 2   • chlorthalidone (HYGROTON) 25 MG tablet Take 1 tablet by mouth Daily for 90 days. 90 tablet 1   • Glucagon, rDNA, (Glucagon Emergency) 1 MG kit Inject 1 kit as directed Daily As Needed (hypoglycemia requiring assistance). E11.65 2 each 5   • HYDROcodone-acetaminophen (NORCO) 5-325 MG per tablet Take 1 tablet by mouth Every 6 (Six) Hours As Needed.     • insulin aspart (NovoLOG) 100 UNIT/ML injection Inject 15 Units under the skin into the appropriate area as directed 3 (Three) Times a Day Before Meals. 10 each 0   • insulin detemir (Levemir FlexTouch) 100 UNIT/ML injection Inject 25 Units under the skin into the appropriate area as directed 2 (Two) Times a Day for 90 days. 15 pen 1   • Insulin Pen Needle (Pen Needles) 31G X 5 MM misc Inject 1 each under the skin into the appropriate area as directed Daily With Breakfast, Lunch & Dinner. 100 each 1   • leuprolide (LUPRON) 22.5 MG injection Inject 22.5 mg into the appropriate muscle as directed by prescriber Every 3 (Three) Months.     • metoprolol succinate XL (Toprol XL) 200 MG 24 hr tablet Take 0.5 tablets by mouth 2 (two) times a day. 180 tablet 1   • Multiple Vitamins-Minerals (EMERGEN-C VITAMIN C PO) Take 1 tablet by mouth Daily.     • potassium chloride ER (K-TAB) 20 MEQ tablet controlled-release ER tablet Take 20 mEq by mouth Daily.     • rosuvastatin (CRESTOR) 20 MG tablet Take 1 tablet by mouth Daily. 90 tablet 1   • valsartan (DIOVAN) 320 MG tablet Take 1 tablet by mouth Daily. 90 tablet 1     No current facility-administered  "medications on file prior to visit.     Past Medical History:   Diagnosis Date   • Adenocarcinoma of prostate (HCC)     negetive prostate biopsy march 2019    • Atrial fibrillation (HCC)    • Bladder diverticulum    • ED (erectile dysfunction)    • Hyperlipidemia    • Spermatocele      Past Surgical History:   Procedure Laterality Date   • APPENDECTOMY     • BLADDER DIVERTICULECTOMY      july 2020    • CYSTOSCOPY BLADDER BIOPSY N/A 10/15/2021    Procedure: CYSTOSCOPY;  BLADDER NECK BIOPSY; FULGURATION OF BLADDER NECK;  Surgeon: Filiberto Sykes MD;  Location: [facility] OR;  Service: Urology;  Laterality: N/A;   • HYDROCELECTOMY     • PENILE PROSTHESIS IMPLANT  07/20/2022    URINARY SPHINCTER INSERTION ( Dr Gonzalez from [facility])   • PROSTATECTOMY     • TURP / TRANSURETHRAL INCISION / DRAINAGE PROSTATE  02/11/2020   • VASECTOMY       Social History     Socioeconomic History   • Marital status:    Tobacco Use   • Smoking status: Former Smoker     Packs/day: 2.00     Years: 16.00     Pack years: 32.00     Types: Cigarettes   • Smokeless tobacco: Never Used   Vaping Use   • Vaping Use: Never used   Substance and Sexual Activity   • Alcohol use: Not Currently   • Drug use: Never   • Sexual activity: Defer     Objective   Vital Signs:   /62   Pulse 99   Temp 97.6 °F (36.4 °C)   Ht 193 cm (75.98\")   Wt 114 kg (252 lb 6.4 oz)   SpO2 99%   BMI 30.74 kg/m²            Physical Exam  Vitals and nursing note reviewed.   HENT:      Head: Normocephalic.      Mouth/Throat:      Mouth: Mucous membranes are moist.   Cardiovascular:      Rate and Rhythm: Normal rate.      Pulses: Normal pulses.           Dorsalis pedis pulses are 2+ on the right side and 2+ on the left side.        Posterior tibial pulses are 2+ on the right side and 2+ on the left side.      Heart sounds: Normal heart sounds.      Comments: No afib at this visit  Pulmonary:      Effort: Pulmonary effort is normal.      Breath sounds: " Normal breath sounds. No wheezing or rhonchi.   Abdominal:      General: Bowel sounds are normal.      Palpations: Abdomen is soft.   Musculoskeletal:         General: No swelling. Normal range of motion.      Cervical back: Normal range of motion and neck supple.   Feet:      Right foot:      Protective Sensation: 10 sites tested. 10 sites sensed.      Skin integrity: Skin integrity normal.      Toenail Condition: Right toenails are normal.      Left foot:      Protective Sensation: 10 sites tested. 10 sites sensed.      Skin integrity: Skin integrity normal.      Toenail Condition: Left toenails are normal.   Skin:     General: Skin is warm and dry.      Comments: Reviewed rotation of sites away from umbilicus throughout the remainder of the abdomen and posterior upper arms and anterior upper legs and posterior trunk overlying the buttocks area.   Neurological:      Mental Status: He is alert and oriented to person, place, and time. Mental status is at baseline.   Psychiatric:         Mood and Affect: Mood normal.         Behavior: Behavior normal.         Judgment: Judgment normal.        Result Review :   The following data was reviewed by: Brigitte Ralph MD on 04/25/2022 and 8/1/2022:  Measure glucose before each meal and bedtime  Low glucose is defined as less than 70 mg/DL  Premeal and fasting glucose goals  mg/DL  2-hour after meal goals 140 to 180 mg/DL  Take   Levemir 25 units every morning  Take Levemir 25 units every evening, 12 hours later  Note: If a.m. glucose is less than 80 or overnight glucose is less than 100 then decrease Levemir by 10%    Mealtime       Novolog 10 units daily [ may return to 15 units if the glucose start rising to 170 or greater after meals.   Total carbohydrates:[Minus dietary fiber.]  1 serving equal 15 g (about half a cup)  2 servings equal 30 g (about 1 cup)  3 servings equals 45 g  4 servings equal 60 g  5 servings equals 75 g per meal  Take Humalog,  lispro/NovoLog,/ Novolin R /aspart per scale, 15 minutes before meals  Add plus scale as follows    If blood glucose is above 140 then add the following dose of Humalog, lispro/NovoLog, aspart to mealtime insulin [1: 30]    140-170 0 unit  171-200 2 units  201-230 3 units  231-260 4 units  261-290 5 units  291-320 6 units  321-350 7 units  351-380 8 units  381-or greater 9 units  Component      Latest Ref Rng & Units 7/28/2022 7/28/2022 7/28/2022           8:45 AM  8:45 AM 12:20 PM   Total Cholesterol      100 - 199 mg/dL  158    Triglycerides      0 - 149 mg/dL  181 (H)    HDL Cholesterol      >39 mg/dL  39 (L)    VLDL Cholesterol [place]      5 - 40 mg/dL  31    LDL Cholesterol       0 - 99 mg/dL  88    Creatinine, Urine      Not Estab. mg/dL   83.3   Microalbumin, Urine      Not Estab. ug/mL   7.3   Microalbumin/Creatinine Ratio      0 - 29 mg/g creat   9   TSH Baseline      0.450 - 4.500 uIU/mL 1.100         Component   Ref Range & Units 4 d ago 11 d ago 7 mo ago 10 mo ago   Hemoglobin A1C   4.8 - 5.6 % 5.8 High   5.5 R, CM  6.19 High  R  12.20           Component   Ref Range & Units 4 mo ago 7 mo ago   Hemoglobin A1C   4.80 - 5.60 % 6.19 High   12.20 High     [facility]ing Agency  [facility]        Component   Ref Range & Units 4 mo ago 7 mo ago   Total Cholesterol   0 - 200 mg/dL 156  149    Triglycerides   0 - 150 mg/dL 209 High   190 High     HDL Cholesterol   40 - 60 mg/dL 42  32 Low     LDL Cholesterol    0 - 100 mg/dL 79  84    VLDL Cholesterol   5 - 40 mg/dL 35  33    LDL/HDL Ratio  1.72  2.47    Resulting Agency  [facility]                Assessment and Plan    57-year-old male  presents with new onset type 2 diabetes September 2017.  Since then hemoglobin A1c has improved from 12.20% to 6.19%.  He presents today in consultation as requested by Jennifer Mckeon DO.  Comorbidities: BMI 31.40, former nicotine habit of 2 packs/day for 16 years, atrial fibrillation on eliquis, no strokes, no  CVA, hyperlipidemia controlled, hypertension trolled.  No symptoms of neuropathy, no retinopathy    Type 2 diabetes currently controlled within goal hemoglobin A1c 6-7% without hypoglycemia.    Last dilated eye exam October 2021-no retinopathy noted [facility] in [facility].  Annual dilated eye exam recommended.    No hypoglycemia on insulin therapy  Hyperglycemia markedly improved on current insulin therapy    Will adjust mealtime and given recommendation to decrease as needed basal dose in case of starting to have any overnight hypoglycemia between this and next visit and mealtime hyperglycemia between this and next visit.  Diagnoses and all orders for this visit:    1. Insulin dependent type 2 diabetes mellitus (HCC) (Primary)  -     POC Glucose Fingerstick         Home instructions 4/25/22  Diabetes medications    Measure glucose before each meal and bedtime  Low glucose is defined as less than 70 mg/DL  Premeal and fasting glucose goals  mg/DL  2-hour after meal goals 140 to 180 mg/DL  Take   Levemir 25 units every morning  Take Levemir 25 units every evening, 12 hours later  Note: If a.m. glucose is less than 80 or overnight glucose is less than 100 then decrease Levemir by 10%    Mealtime       Novolog 10 units daily [ may return to 15 units if the glucose start rising to 170 or greater after meals.   Total carbohydrates:[Minus dietary fiber.]  1 serving equal 15 g (about half a cup)  2 servings equal 30 g (about 1 cup)  3 servings equals 45 g  4 servings equal 60 g  5 servings equals 75 g per meal  Take Humalog, lispro/NovoLog,/ Novolin R /aspart per scale, 15 minutes before meals  Add plus scale as follows    If blood glucose is above 140 then add the following dose of Humalog, lispro/NovoLog, aspart to mealtime insulin [1: 30]    140-170 0 unit  171-200 2 units  201-230 3 units  231-260 4 units  261-290 5 units  291-320 6 units  321-350 7 units  351-380 8 units  381-or greater 9  units    Sick day rules: If glucose is over 250 mg/DL then check glucose every 2 hours and treat with correction scale above.    Example at breakfast take mealtime insulin as above and if glucose 268 then [per scale above] add 6 units.   Take total dose 15 minutes before meal. If at a restaurant, unsure when meal is coming then may take mealtime insulin immediately after the last bite of food.  -------------------------------------------------------------------------------------------------------------------------    Using cell phone or computer Mealtime i may also  be calculated     [employer] applications.      Meal planning  25 g plan  Meals 3 meals and 3 snacks  Please limit meals to 75 g (5 servings)  Please limit snacks to 30 g (2 serving)    Counting carbohydrates use phone charline calorie [email or web address]  1 serving equal 15 g (about half a cup)  2 servings equal 30 g (about 1 cup)  3 servings equals 45 g  4 servings equal 60 g  5 servings ago 75 g  6 servings equals 90 g    Breakfast example (5 servings of carbohydrates)    A.  Half a cup cereal (1 servings) and 1 cup milk (1 serving) and 1 fruit/half a cup fruit juice (1 serving), 1 slice of bread with peanut butter (1 serving) = 5 servings carbohydrate    B.  Eggs plus henley plus sausage plus cheese with 2 slices of bread (2 servings) and one yogurt (1 serving )and one fruit (1 serving) and half a cup hash brown potatoes (1 serving) =5 servings carbohydrate    Lunch or dinner example  A.  [place]a equals 2 slices of bread (2 servings) and 1 fruit (1 serving), half a cup of potato salad (1 serving) and half a cup of ice cream (1 serving ) = 5 servings carbohydrates    B.  Any meat/chicken/fish and 2 cups Pasta (4 servings) and 1 slice of bread (1 serving)  = 5 servings carbohydrate    C.  Any meat chicken fish 1 cup of rice (3 servings), 1 fruit (1 serving) = 4 servings of carbohydrate    May add vegetables/salads to any meal for free    Except the following are 1  serving of carbohydrates    A.  Lima beans half a cup (1 serving)  B.  1 cup kidney/thomas beans (1 serving 30 g -14 g fiber equals 16 g)  C.  Half a cup of corn (1 serving)  D.  Half cup of peas (1 serving)  E.  Half a cup of potato, cubes (medium potato) (1 serving)  F  Sweets: Prep packaged 3 ounce ice cream cup, Moore brand/Nestlé 1 serving (15 g carbohydrates)  Notes: Fruits: 1 fruit about the size for lady's visit is 1 serving of carbohydrate  Half a cup fruit juice 1 serving    Snack examples (1 serving carbohydrate/15 g]----in your case you can double the servings as you are allowed to servings or 30 g of carbohydrates.    1.  No carbohydrates (0 servings):  Raw celery, radishes, bell pepper, banana peppers, corn, carrots, cucumbers dipped in salad dressings, guacamole, obese seasoning, peanut butter, almond butter, salsa    2.  15 g (1 serving): Half a sandwich: Turkey and cheese, smoked salmon and cream cheese, peanut butter and banana slices, cheese and crackers, peanut butter and crackers, half a bagel or half an English muffin or 1 cup of fruit plus cottage cheese or 1 rice cake with sugar-free fruit spreading    3.  15 g or less (1 serving): Protein shakes: Ensure DM, Glucerna, boost protein: Wings and salad dressing, 1 taco    4.  Fruit, 1 servin apple/orange/8 large grapes/peach/a protein/fat (avocado/nuts/meat/ cottage cheese)      Hypercholesterolemia  meal planning  Nutrition counseling  1. Improve glucose control  2. Limit saturated fats to less than 30% at each meal [Use Chai arreola to read product labels]  3. Limit or avoid the following fats: Henley, butter, lard, coconut. Substitute with turkey henley, other vegetable oils, margarine  Limit or avoid marbling/fat on beef. Avoid hamburgers. Substitute with turkey/chicken/venison/vegetarians/salmon burgers.  Limit dairy: Milk cheese butter.  Limit eggs to 3 a week. Substitute with unlimited egg whites.  Limit bagels, biscuits, croissants:  Substitute with wheat bread, English muffin, lázaro pockets, tortilla     Follow Up   No follow-ups on file.  Patient was given instructions and counseling regarding his condition or for health maintenance advice. Please see specific information pulled into the AVS if appropriate.

## 2022-08-27 RX ORDER — CHLORTHALIDONE 25 MG/1
TABLET ORAL
Qty: 90 TABLET | Refills: 1 | OUTPATIENT
Start: 2022-08-27

## 2022-08-29 RX ORDER — CHLORTHALIDONE 25 MG/1
25 TABLET ORAL DAILY
Qty: 90 TABLET | Refills: 1 | OUTPATIENT
Start: 2022-08-29 | End: 2022-11-27

## 2022-09-07 ENCOUNTER — TELEPHONE (OUTPATIENT)
Dept: SLEEP MEDICINE | Facility: HOSPITAL | Age: 57
End: 2022-09-07

## 2022-09-07 NOTE — TELEPHONE ENCOUNTER
Call to pt re: an incoming message.  Pt needs FAA paperwork filled out.  Scheduled pt for 9/14/22 @ 10:30am.

## 2022-09-09 ENCOUNTER — TELEPHONE (OUTPATIENT)
Dept: ENDOCRINOLOGY | Age: 57
End: 2022-09-09

## 2022-09-09 RX ORDER — INSULIN DETEMIR 100 [IU]/ML
25 INJECTION, SOLUTION SUBCUTANEOUS 2 TIMES DAILY
Qty: 15 ML | Refills: 0 | Status: SHIPPED | OUTPATIENT
Start: 2022-09-09 | End: 2022-10-11

## 2022-09-09 NOTE — TELEPHONE ENCOUNTER
Sent. Please let patient know. Thanks!
9/9 Eriberto pt and let him that prescription was sent to suggested pharmacy   
PT IS OUT OF HIS INSULIN AND PT IS OUT OF TOWN, CALLED PHARMACY AND THEY TOLD HIM HE IS OUT OF REFILLS FOR HIS LEVEMIR INSULIN. HE CALLED WANTING US TO SEND HIS PRESCRIPTION TO THE   [facility] IN [place]  ADDRESS IS :[address]   
no

## 2022-09-12 RX ORDER — CHLORTHALIDONE 25 MG/1
TABLET ORAL
Qty: 90 TABLET | Refills: 1 | OUTPATIENT
Start: 2022-09-12

## 2022-09-14 ENCOUNTER — OFFICE VISIT (OUTPATIENT)
Dept: SLEEP MEDICINE | Facility: HOSPITAL | Age: 57
End: 2022-09-14

## 2022-09-14 VITALS
DIASTOLIC BLOOD PRESSURE: 81 MMHG | SYSTOLIC BLOOD PRESSURE: 138 MMHG | WEIGHT: 256.7 LBS | HEIGHT: 76 IN | OXYGEN SATURATION: 98 % | BODY MASS INDEX: 31.26 KG/M2 | HEART RATE: 65 BPM

## 2022-09-14 DIAGNOSIS — G47.33 OSA ON CPAP: ICD-10-CM

## 2022-09-14 DIAGNOSIS — Z99.89 OSA ON CPAP: ICD-10-CM

## 2022-09-14 PROCEDURE — 99213 OFFICE O/P EST LOW 20 MIN: CPT | Performed by: INTERNAL MEDICINE

## 2022-09-14 PROCEDURE — G0463 HOSPITAL OUTPT CLINIC VISIT: HCPCS | Performed by: INTERNAL MEDICINE

## 2022-09-14 NOTE — PROGRESS NOTES
"  [address] [address]  Phone: [phone]  Fax: [phone]      SLEEP CLINIC FOLLOW UP PROGRESS NOTE.    Peter Crook  [id number]   1965  57 y.o.  male      PCP: Jennifer Mckeon DO      Date of visit: 9/14/2022    Chief Complaint   Patient presents with   • Sleep Apnea       HPI:  This is a 57 y.o. years old patient is here for the management of obstructive sleep apnea.  Sleep apnea is mild in severity with a AHI of 9.3/hr. Patient is using positive airway pressure therapy with APAP and the symptoms of snoring, non-restorative sleep and daytime excessive sleepiness have improved significantly on the therapy. Normally goes to bed at 8:30 PM and wakes up at 4 AM.  The patient wakes up 3 time(s) during the night and has no problem going back to sleep.  Feels refreshed after waking up.  Patient also denies headaches and nasal congestion.  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     SOCIAL (habits pertaining to sleep medicine)  History tobacco use:No   History of alcohol use: 0 per week  Caffeine use: 3     REVIEW OF SYSTEMS:   Butler Sleepiness Scale :Total score: 7   Nasal congestion:No   Dry mouth/nose:No   Post nasal drip; No   Acid reflux/Heartburn:Yes   Abd bloating:No   Morning headache:No   Anxiety:No   Depression:No    PHYSICAL EXAMINATION:  CONSTITUTIONAL:  Vitals:    09/14/22 0900   BP: 138/81   Pulse: 65   SpO2: 98%   Weight: 116 kg (256 lb 11.2 oz)   Height: 193 cm (75.98\")    Body mass index is 31.26 kg/m².   NOSE: nasal passages are clear, No deformities noted   RESP SYSTEM: Not in any respiratory distress, no chest deformities noted,   CARDIOVASULAR: No edema noted  NEURO: Oriented x 3, gait normal,  Mood and affect appeared appropriate      Data reviewed:  The Smart card downloaded on 9/14/2022 has been reviewed independently by me " for compliance and discussed the data with the patient.   Compliance; 98%  More than 4 hr use, 90%  Average use of the device 6 hours and 15 minutes per night  Residual AHI: 2.5 /hr (goal < 5.0 /hr)  Mask type: Nasal pillows  Device: ResMed  DME: Buys supplies from [employer]      ASSESSMENT AND PLAN:  · Obstructive sleep apnea ( G 47.33).  The symptoms of sleep apnea have improved with the device and the treatment.  Patient's compliance with the device is excellent for treatment of sleep apnea.  I have independently reviewed the smart card down load and discussed with the patient the download data and encouarged the patient to continue to use the device.The residual AHI is acceptable. The device is benefiting the patient and the device is medically necessary.  Without proper control of sleep apnea and good compliance there is a increased risk for hypertension, diabetes mellitus and nonrestorative sleep with hypersomnia which can increase risk for motor vehicle accidents.  Untreated sleep apnea is also a risk factor for development of atrial fibrillation, pulmonary hypertension and stroke. The patient is also instructed to get the supplies from the [employer] and and change them on a regular basis.  A prescription for supplies has been sent to the [employer].  I have also discussed the good sleep hygiene habits and adequate amount of sleep needed for good health.  · Obesity  1 with BMI is Body mass index is 31.26 kg/m².. I have discuss the relationship between the weight and sleep apnea. The benefit of weight loss in reducing severity of sleep apnea was discussed. Discussed diet and exercise with the patient to achieve ideal BMI.   · Return in about 1 year (around 9/14/2023) for Next scheduled follow-up. . Patient's questions were answered.      Peter Rossi MD  Sleep Medicine.  Medical Director, [facility] sleep [facility]  9/14/2022 ,

## 2022-09-19 ENCOUNTER — TELEPHONE (OUTPATIENT)
Dept: CARDIOLOGY | Facility: CLINIC | Age: 57
End: 2022-09-19

## 2022-09-19 DIAGNOSIS — I48.0 PAROXYSMAL ATRIAL FIBRILLATION: Primary | ICD-10-CM

## 2022-09-19 NOTE — TELEPHONE ENCOUNTER
Received voicemail from patient. Requesting Holter monitor and stress test.    SW patient. He is a  the [facility] is requiring 24-hour Holter monitor and stress test prior to  license renewal.

## 2022-09-28 ENCOUNTER — HOSPITAL ENCOUNTER (OUTPATIENT)
Dept: NUCLEAR MEDICINE | Facility: HOSPITAL | Age: 57
Discharge: HOME | End: 2022-09-28

## 2022-09-28 ENCOUNTER — HOSPITAL ENCOUNTER (OUTPATIENT)
Dept: NUCLEAR MEDICINE | Facility: HOSPITAL | Age: 57
End: 2022-09-28

## 2022-09-28 DIAGNOSIS — Z01.810 PREOPERATIVE CARDIOVASCULAR EXAMINATION: Primary | ICD-10-CM

## 2022-09-28 DIAGNOSIS — I48.0 PAROXYSMAL ATRIAL FIBRILLATION: ICD-10-CM

## 2022-09-29 ENCOUNTER — HOSPITAL ENCOUNTER (OUTPATIENT)
Dept: CARDIOLOGY | Facility: HOSPITAL | Age: 57
Discharge: HOME | End: 2022-09-29
Admitting: FAMILY MEDICINE

## 2022-09-29 DIAGNOSIS — I48.0 PAROXYSMAL ATRIAL FIBRILLATION: ICD-10-CM

## 2022-09-29 PROCEDURE — 93225 XTRNL ECG REC<48 HRS REC: CPT

## 2022-09-29 RX ORDER — CHLORTHALIDONE 25 MG/1
25 TABLET ORAL DAILY
Qty: 90 TABLET | Refills: 0 | Status: SHIPPED | OUTPATIENT
Start: 2022-09-29 | End: 2022-12-19 | Stop reason: SDUPTHER

## 2022-09-29 RX ORDER — ROSUVASTATIN CALCIUM 20 MG/1
20 TABLET, COATED ORAL DAILY
Qty: 90 TABLET | Refills: 0 | Status: SHIPPED | OUTPATIENT
Start: 2022-09-29 | End: 2023-02-06 | Stop reason: SDUPTHER

## 2022-10-08 LAB
MAXIMAL PREDICTED HEART RATE: 164 BPM
STRESS TARGET HR: 139 BPM

## 2022-10-11 RX ORDER — INSULIN DETEMIR 100 [IU]/ML
25 INJECTION, SOLUTION SUBCUTANEOUS 2 TIMES DAILY
Qty: 45 ML | Refills: 1 | Status: SHIPPED | OUTPATIENT
Start: 2022-10-11 | End: 2023-03-27

## 2022-10-11 RX ORDER — METOPROLOL SUCCINATE 200 MG/1
TABLET, EXTENDED RELEASE ORAL
Qty: 90 TABLET | Refills: 3 | Status: SHIPPED | OUTPATIENT
Start: 2022-10-11 | End: 2023-02-06 | Stop reason: SDUPTHER

## 2022-10-21 ENCOUNTER — APPOINTMENT (OUTPATIENT)
Dept: CARDIOLOGY | Facility: HOSPITAL | Age: 57
End: 2022-10-21

## 2022-11-01 ENCOUNTER — TELEPHONE (OUTPATIENT)
Dept: ENDOCRINOLOGY | Age: 57
End: 2022-11-01

## 2022-11-01 NOTE — TELEPHONE ENCOUNTER
PT CALLED IN ASKING FOR LABS BEFORE HIS APPT. PT IS A  AND NEEDS HIS A1C FOR THE [facility]. PT ASKED FOR A CALL BACK [phone] TO CONFIRM LABS AND WILL GET THEM DRAWN AT THE HOSPITAL.

## 2022-11-03 ENCOUNTER — OFFICE VISIT (OUTPATIENT)
Dept: ENDOCRINOLOGY | Age: 57
End: 2022-11-03

## 2022-11-03 ENCOUNTER — OFFICE VISIT (OUTPATIENT)
Dept: CARDIOLOGY | Facility: CLINIC | Age: 57
End: 2022-11-03

## 2022-11-03 VITALS
SYSTOLIC BLOOD PRESSURE: 134 MMHG | BODY MASS INDEX: 30.93 KG/M2 | WEIGHT: 254 LBS | HEART RATE: 72 BPM | HEIGHT: 76 IN | DIASTOLIC BLOOD PRESSURE: 92 MMHG

## 2022-11-03 VITALS
HEIGHT: 76 IN | DIASTOLIC BLOOD PRESSURE: 90 MMHG | SYSTOLIC BLOOD PRESSURE: 130 MMHG | RESPIRATION RATE: 18 BRPM | OXYGEN SATURATION: 97 % | BODY MASS INDEX: 31.05 KG/M2 | TEMPERATURE: 97.7 F | HEART RATE: 76 BPM | WEIGHT: 255 LBS

## 2022-11-03 DIAGNOSIS — Z79.4 INSULIN DEPENDENT TYPE 2 DIABETES MELLITUS: Primary | Chronic | ICD-10-CM

## 2022-11-03 DIAGNOSIS — E78.5 HYPERLIPIDEMIA, UNSPECIFIED HYPERLIPIDEMIA TYPE: ICD-10-CM

## 2022-11-03 DIAGNOSIS — E78.2 MIXED HYPERLIPIDEMIA: ICD-10-CM

## 2022-11-03 DIAGNOSIS — I10 ESSENTIAL HYPERTENSION: ICD-10-CM

## 2022-11-03 DIAGNOSIS — E11.9 INSULIN DEPENDENT TYPE 2 DIABETES MELLITUS: Primary | Chronic | ICD-10-CM

## 2022-11-03 DIAGNOSIS — Z79.4 INSULIN DEPENDENT TYPE 2 DIABETES MELLITUS: Primary | ICD-10-CM

## 2022-11-03 DIAGNOSIS — I48.0 PAROXYSMAL ATRIAL FIBRILLATION: Primary | ICD-10-CM

## 2022-11-03 DIAGNOSIS — E11.9 INSULIN DEPENDENT TYPE 2 DIABETES MELLITUS: Primary | ICD-10-CM

## 2022-11-03 PROCEDURE — 99214 OFFICE O/P EST MOD 30 MIN: CPT | Performed by: FAMILY MEDICINE

## 2022-11-03 PROCEDURE — 99214 OFFICE O/P EST MOD 30 MIN: CPT | Performed by: NURSE PRACTITIONER

## 2022-11-03 NOTE — PROGRESS NOTES
Chief Complaint  Follow-up    Subjective        History of Present Illness  Peter Crook is a 57-year-old  male patient who presents to [facility] CARDIOLOGY for routine follow-up of proximal atrial fibrillation, hypertension, and hyperlipidemia.  He is recently underwent Holter monitor and stress testing for renewal of his  licensure.  He has not had any symptomatic episodes with palpitations, or racing heart rate.  Holter monitor study showed 1 brief episode of atrial for for 42 seconds, with an underlying sinus rhythm with good rate control.  Stress testing was without signs of ischemia, and he maintained sinus rhythm during this as well.  He is not experiencing any chest pains, shortness of breath, dizziness or syncopal episodes.  He has been on chronic anticoagulation with Eliquis due to his atrial fibrillation, and tolerates it well without any issues with bleeding.  Blood pressure in office today is 132/92, diastolic slightly elevated compared to his baseline, however generally is well controlled.  Has not been to the ER, or had any hospitalizations since his previous visit.  Tolerates all of his medications without any issues.  Denies any concerns at today's visit.             PMH  Past Medical History:   Diagnosis Date   • Adenocarcinoma of prostate (HCC)     negetive prostate biopsy march 2019    • Atrial fibrillation (HCC)    • Bladder diverticulum    • ED (erectile dysfunction)    • Hyperlipidemia    • Spermatocele          ALLERGY  No Known Allergies       SURGICALHX  Past Surgical History:   Procedure Laterality Date   • APPENDECTOMY     • BLADDER DIVERTICULECTOMY      july 2020    • CYSTOSCOPY BLADDER BIOPSY N/A 10/15/2021    Procedure: CYSTOSCOPY;  BLADDER NECK BIOPSY; FULGURATION OF BLADDER NECK;  Surgeon: Filiberto Sykes MD;  Location: [facility];  Service: Urology;  Laterality: N/A;   • HYDROCELECTOMY     • PENILE PROSTHESIS IMPLANT  07/20/2022    URINARY  SPHINCTER INSERTION ( Dr Gonzalez from [facility])   • PROSTATECTOMY     • TURP / TRANSURETHRAL INCISION / DRAINAGE PROSTATE  02/11/2020   • VASECTOMY            SOC  Social History     Socioeconomic History   • Marital status:    Tobacco Use   • Smoking status: Former     Packs/day: 2.00     Years: 16.00     Pack years: 32.00     Types: Cigarettes   • Smokeless tobacco: Never   Vaping Use   • Vaping Use: Never used   Substance and Sexual Activity   • Alcohol use: Not Currently   • Drug use: Never   • Sexual activity: Defer         FAMHX  Family History   Problem Relation Age of Onset   • Cancer Father    • Hypertension Father    • No Known Problems Mother    • No Known Problems Sister    • No Known Problems Brother    • No Known Problems Son    • No Known Problems Daughter    • No Known Problems Maternal Grandmother    • No Known Problems Maternal Grandfather    • No Known Problems Paternal Grandmother    • No Known Problems Paternal Grandfather    • No Known Problems Cousin    • No Known Problems Other    • Rheum arthritis Neg Hx    • Osteoarthritis Neg Hx    • Asthma Neg Hx    • Diabetes Neg Hx    • Heart failure Neg Hx    • Hyperlipidemia Neg Hx    • Migraines Neg Hx    • Rashes / Skin problems Neg Hx    • Seizures Neg Hx    • Stroke Neg Hx    • Thyroid disease Neg Hx           MEDSIGONLY  Current Outpatient Medications on File Prior to Visit   Medication Sig   • amLODIPine (NORVASC) 10 MG tablet Take 1 tablet by mouth Daily.   • apixaban (ELIQUIS) 5 MG tablet tablet Take 1 tablet by mouth Every 12 (Twelve) Hours. Was instructed by Dr. Sykes to stop 10/11/21   • chlorthalidone (HYGROTON) 25 MG tablet Take 1 tablet by mouth Daily for 90 days.   • Glucagon, rDNA, (Glucagon Emergency) 1 MG kit Inject 1 kit as directed Daily As Needed (hypoglycemia requiring assistance). E11.65   • insulin aspart (NovoLOG) 100 UNIT/ML injection Inject 15 Units under the skin into the appropriate area as directed 3 (Three)  "Times a Day Before Meals.   • Insulin Pen Needle (Pen Needles) 31G X 5 MM misc Inject 1 each under the skin into the appropriate area as directed Daily With Breakfast, Lunch & Dinner.   • Levemir FlexTouch 100 UNIT/ML injection INJECT 25 UNITS UNDER THE SKIN INTO THE APPROPRIATE AREA AS DIRECTED 2 (TWO) TIMES A DAY FOR 90 DAYS.   • metoprolol succinate XL (TOPROL-XL) 200 MG 24 hr tablet TAKE 0.5 TABLETS BY MOUTH 2 TIMES A DAY.   • Multiple Vitamins-Minerals (EMERGEN-C VITAMIN C PO) Take 1 tablet by mouth Daily.   • potassium chloride ER (K-TAB) 20 MEQ tablet controlled-release ER tablet Take 20 mEq by mouth Daily.   • rosuvastatin (CRESTOR) 20 MG tablet Take 1 tablet by mouth Daily.   • valsartan (DIOVAN) 320 MG tablet Take 1 tablet by mouth Daily.   • [DISCONTINUED] HYDROcodone-acetaminophen (NORCO) 5-325 MG per tablet Take 1 tablet by mouth Every 6 (Six) Hours As Needed.   • [DISCONTINUED] leuprolide (LUPRON) 22.5 MG injection Inject 22.5 mg into the appropriate muscle as directed by prescriber Every 3 (Three) Months.     No current facility-administered medications on file prior to visit.       REVIEW OF SYSTEMS  Review of Systems   Constitutional: Negative for activity change, chills and fatigue.   Respiratory: Negative for cough, chest tightness and shortness of breath.    Cardiovascular: Negative for chest pain, palpitations and leg swelling.   Gastrointestinal: Negative for nausea and vomiting.   Skin: Negative for rash.   Neurological: Negative for dizziness, syncope and light-headedness.   Hematological: Does not bruise/bleed easily.        Objective   Vitals:    11/03/22 1309   BP: 134/92   Pulse: 72   Weight: 115 kg (254 lb)   Height: 193 cm (76\")         Physical Exam  Constitutional:       General: He is awake. He is not in acute distress.     Appearance: Normal appearance.   Eyes:      General: No scleral icterus.     Conjunctiva/sclera: Conjunctivae normal.   Neck:      Vascular: No carotid bruit. "   Cardiovascular:      Rate and Rhythm: Normal rate and regular rhythm.  No extrasystoles are present.     Heart sounds: Normal heart sounds, S1 normal and S2 normal. No murmur heard.    No friction rub.   Pulmonary:      Effort: Pulmonary effort is normal.      Breath sounds: Normal breath sounds. No wheezing, rhonchi or rales.   Abdominal:      General: Bowel sounds are normal. There is no distension.      Palpations: Abdomen is soft.   Musculoskeletal:         General: Normal range of motion.      Right lower leg: No edema.      Left lower leg: No edema.   Skin:     General: Skin is warm and dry.   Neurological:      Mental Status: He is alert and oriented to person, place, and time.         Result Review     The following data was reviewed by BERONICA Mackenzie on 11/09/22.    No results found for: PROBNP  CMP    CMP 12/20/21 4/15/22 7/28/22   Glucose 122 (A) 105 (A) 116 (A)   BUN 20 20 22   Creatinine 0.80 0.84 0.93   eGFR Non African Am 100     Sodium 138 137 142   Potassium 3.9 3.1 (A) 3.7   Chloride 100 100 99   Calcium 10.5 9.5 9.9   Total Protein   7.5   Albumin 4.40 4.10 4.7   Globulin   2.8   Total Bilirubin 0.4 0.4 0.3   Alkaline Phosphatase 99 91 112   AST (SGOT) 33 28 29   ALT (SGPT) 48 (A) 52 (A) 47 (A)   (A) Abnormal value            CBC w/diff    CBC w/Diff 12/20/21   WBC 7.45   RBC 5.05   Hemoglobin 13.7   Hematocrit 41.9   MCV 83.0   MCH 27.1   MCHC 32.7   RDW 14.1   Platelets 222   Neutrophil Rel % 63.1   Immature Granulocyte Rel % 0.3   Lymphocyte Rel % 25.0   Monocyte Rel % 6.8   Eosinophil Rel % 4.4   Basophil Rel % 0.4            Lab Results   Component Value Date    TSH 1.100 07/28/2022      Magnesium   Date Value Ref Range Status   09/22/2021 2.0 1.6 - 2.6 mg/dL Final        Lipid Panel    Lipid Panel 12/20/21 7/28/22   Total Cholesterol 156    Total Cholesterol  158   Triglycerides 209 (A) 181 (A)   HDL Cholesterol 42 39 (A)   VLDL Cholesterol 35 31   LDL Cholesterol  79 88   LDL/HDL  Ratio 1.72    (A) Abnormal value              Results for orders placed during the hospital encounter of 05/09/22    Adult Transthoracic Echo Complete W/ Cont if Necessary Per Protocol    Interpretation Summary  · Left ventricular ejection fraction appears to be 56 - 60%. Left ventricular systolic function is normal.  · Left ventricular wall thickness is consistent with mild septal asymmetric hypertrophy.  · Left ventricular diastolic function is consistent with (grade I) impaired relaxation.  · There are no significant valvular abnormalities.    Results for orders placed in visit on 09/28/22    Treadmill Stress Test    Interpretation Summary  · Exercise treadmill stress test is negative for ischemia.  · Good exercise tolerance noted. Patient reached more than 100% of the maximum predicted heart rate.  · There was no chest pain at max exercise.  · There were no significant arrhythmias.  · There were no ischemic EKG changes at maximum exercise.         Assessment and Plan   Diagnoses and all orders for this visit:    1. Paroxysmal atrial fibrillation (HCC) (Primary)  Assessment & Plan:  Currently in sinus rhythm, he did have 42-second episode of atrial fibrillation on most recent Holter monitor.  We will continue metoprolol for rate and rhythm control, as well as chronic anticoagulation on Eliquis.      2. Essential hypertension  Assessment & Plan:  Blood pressure is reasonably well controlled.  Continue current doses of chlorthalidone , amlodipine, and valsartan.  Recommend to periodically monitor at home and if he has any increasing readings of blood pressure to follow back up with us.      3. Mixed hyperlipidemia  Assessment & Plan:  Reasonable control of cholesterol most recent LDL at 88.  Would recommend with his underlying diabetes for tight control.  Continue current dose of statin therapy, which is being managed by his PCP.        [id number] Score: 2     Follow Up   No follow-ups on file.    Patient was  given instructions and counseling regarding his condition or for health maintenance advice. Please see specific information pulled into the AVS if appropriate.     Amy Hickey, APRN  11/09/22  13:11 EDT    Dictated Utilizing Dragon Dictation

## 2022-11-03 NOTE — PROGRESS NOTES
Chief Complaint  Chief Complaint   Patient presents with   • Diabetes     PATIENT IS NEEDING HELP WITH  A PIECE OF MAIL HE RECEIVED- CURRENT MEASUREMENT OF HIS HEMOGLOBIN A1C. NEEDING LAB WORK DONE TODAY BECAUSE HE IS GOING OUT OF TOWN TOMORROW.   PATIENT CLAIMS HE TRIES TO CHECK BS DAILY  HIGHEST BS, PAST MONTH- 193  LOWEST BS, PAST MONTH- 90  LAST EYE EXAM- OCTOBER 2022  NO SIGNS OF DIABETIC RETINOPATHY   NO TINGLING, BURNING, OR NUMBNESS IN THE LEGS OR FEET           Subjective          History of Present Illness    Peter Crook 57 y.o. presents for a follow-up evaluation for type 2 DM    He has been diabetic since 09/2021  When patient was hospitalized for diabetes he saw a diabetic educator.    Pt is on the following medications for their DM: Levemir 25 units 2 times daily and NovoLog 15 units 3 times daily before meals      Denies chest pain, shortness of breath, vision changes or numbness and tingling in feet/hands.    Weight gain of 3 lbs since last visit.    Pt does not have a history of DM retinopathy.  Last eye exam was 10/22    Pt does not have a history of nephropathy.  Patient is currently taking ARB    Pt does not have neuropathy.    Pt does not have a history of CAD or CVA.    Last A1C in 07/22 was 5.8    Last microalbumin in 07/22 was negative          Blood Sugars    Blood glucoses are checked 3-4 times a week.    Pre-meal blood glucoses: 120s    Pt has no episodes of hypoglycemia. - pt states that he has no known blood sugar below 70            Hyperlipidemia     Pt denies any muscle/body aches, chest pain, or shortness of breath    Pt is currently taking Crestor 20 mg HS    Last lipid panel in 07/22 showed Total 158, Triglycerides 181, HDL 39 and LDL 88            Hypertension    Pt has A. Fib    Pt denies any chest pain, palpitations, shortness of breath, or headache    Current regimen includes amlodipine 10 mg daily, chlorthalidone 25 mg daily, valsartan 320 mg daily and metoprolol succinate  "200 mg daily    Pt follows with cardiology            Other History    Pt had adenocarcinoma of prostate - had prostatectomy and radiation treatment due to being in lymphnode        I have reviewed the patient's allergies, medicines, past medical hx, family hx and social hx.    Objective   Vital Signs:   /90   Pulse 76   Temp 97.7 °F (36.5 °C) (Temporal)   Resp 18   Ht 193 cm (76\")   Wt 116 kg (255 lb)   SpO2 97%   BMI 31.04 kg/m²       Physical Exam   Physical Exam  Constitutional:       General: He is not in acute distress.     Appearance: Normal appearance. He is not diaphoretic.   HENT:      Head: Normocephalic and atraumatic.   Eyes:      General:         Right eye: No discharge.         Left eye: No discharge.   Skin:     General: Skin is warm and dry.   Neurological:      Mental Status: He is alert.   Psychiatric:         Mood and Affect: Mood normal.         Behavior: Behavior normal.                    Results Review:   Hemoglobin A1C   Date Value Ref Range Status   07/28/2022 5.8 (H) 4.8 - 5.6 % Final     Comment:              Prediabetes: 5.7 - 6.4           Diabetes: >6.4           Glycemic control for adults with diabetes: <7.0   07/21/2022 5.5 4.3 - 5.6 % Final     Comment:     (note)  A1C% Reference Range:  4.3 - 5.6  Normal range  5.7 - 6.4  Pre-diabetic -increased risk for developing diabetes  mellitus.  >=6.5      Diabetic -diagnostic of diabetes mellitus.    Note: For diagnosis of diabetes in individuals without unequivocal  hyperglycemia, results should be confirmed by repeat testing.  Patients with conditions that shorten erythrocyte survival, such as  recovery from acute blood loss, hemolytic anemia, kidney disease,  or the presence of unstable hemogloblins like HbSS, HbCC, and HbSC  may yield falsely decreased HbA1c test results. Iron deficiency may  yield falsely increased HbA1c test results.     Total Cholesterol   Date Value Ref Range Status   12/20/2021 156 0 - 200 mg/dL Final "     Triglycerides   Date Value Ref Range Status   07/28/2022 181 (H) 0 - 149 mg/dL Final   12/20/2021 209 (H) 0 - 150 mg/dL Final     HDL Cholesterol   Date Value Ref Range Status   07/28/2022 39 (L) >39 mg/dL Final   12/20/2021 42 40 - 60 mg/dL Final     LDL Chol Calc (NIH)   Date Value Ref Range Status   07/28/2022 88 0 - 99 mg/dL Final     VLDL Cholesterol Milton   Date Value Ref Range Status   07/28/2022 31 5 - 40 mg/dL Final     LDL/HDL Ratio   Date Value Ref Range Status   12/20/2021 1.72  Final         Assessment and Plan {CC Problem List  Visit Diagnosis  ROS  Review (Popup)  Health Maintenance  Quality  BestPractice  Medications  SmartSets  SnapShot Encounters  Media :23  Diagnoses and all orders for this visit:    1. Insulin dependent type 2 diabetes mellitus (HCC) (Primary)  -     Hemoglobin A1c; Future  -     Comprehensive Metabolic Panel; Future    Check labs - going to get in [place]  Continue with Levemir 25 units 2 times daily and NovoLog 15 units 3 times daily before meals  Check blood sugars 3 times daily - in the morning when you get up, before lunch and before dinner.  Rules of 15 reviewed with patient on how to treat low blood sugars  Pt has no issue with checking his blood sugar, he states that he has gotten busy is out of the habit of checking.  CGMs were discuss with patient - he is willing to try the new Gabriel 3; its lighter and smaller  Sample of Gabriel 3 was placed on at office visit today - download in 14 days.  Pt is to let me know if he likes it and if so will send prescription to pharmacy.      2. Hyperlipidemia, unspecified hyperlipidemia type  -     Comprehensive Metabolic Panel; Future  -     Lipid Panel; Future    Check labs  Continue with statin      3. Essential hypertension  -     Comprehensive Metabolic Panel; Future    Continue with current medication regimen  Defer management to PCP/cardiology              RTC in 4 months with me      Follow Up     Patient was given  instructions and counseling regarding her condition or for health maintenance advice. Please see specific information pulled into the AVS if appropriate.              Valentina Osborn, EBRONICA  11/03/22

## 2022-11-03 NOTE — PATIENT INSTRUCTIONS
Check blood sugars 3 times daily - in the morning when you get up, before lunch and before dinner.

## 2022-11-03 NOTE — TELEPHONE ENCOUNTER
11/3 called and sw pt he is headed back to [place] today told him to call us with the fax # and we will fax the labs order

## 2022-11-09 NOTE — ASSESSMENT & PLAN NOTE
Blood pressure is reasonably well controlled.  Continue current doses of chlorthalidone , amlodipine, and valsartan.  Recommend to periodically monitor at home and if he has any increasing readings of blood pressure to follow back up with us.

## 2022-11-09 NOTE — ASSESSMENT & PLAN NOTE
Reasonable control of cholesterol most recent LDL at 88.  Would recommend with his underlying diabetes for tight control.  Continue current dose of statin therapy, which is being managed by his PCP.

## 2022-11-09 NOTE — ASSESSMENT & PLAN NOTE
Currently in sinus rhythm, he did have 42-second episode of atrial fibrillation on most recent Holter monitor.  We will continue metoprolol for rate and rhythm control, as well as chronic anticoagulation on Eliquis.

## 2022-11-11 LAB
CHOLEST SERPL-MCNC: 147 MG/DL (ref 100–199)
HDLC SERPL-MCNC: 42 MG/DL
IMP & REVIEW OF LAB RESULTS: NORMAL
LDLC SERPL CALC-MCNC: 71 MG/DL (ref 0–99)
TRIGL SERPL-MCNC: 203 MG/DL (ref 0–149)
VLDLC SERPL CALC-MCNC: 34 MG/DL (ref 5–40)

## 2022-11-18 DIAGNOSIS — Z79.4 INSULIN DEPENDENT TYPE 2 DIABETES MELLITUS: Primary | Chronic | ICD-10-CM

## 2022-11-18 DIAGNOSIS — E11.9 INSULIN DEPENDENT TYPE 2 DIABETES MELLITUS: Primary | Chronic | ICD-10-CM

## 2022-11-18 RX ORDER — BLOOD-GLUCOSE SENSOR
1 EACH MISCELLANEOUS
Qty: 6 EACH | Refills: 1 | Status: SHIPPED | OUTPATIENT
Start: 2022-11-18 | End: 2022-11-29 | Stop reason: SDUPTHER

## 2022-11-29 DIAGNOSIS — Z79.4 INSULIN DEPENDENT TYPE 2 DIABETES MELLITUS: Chronic | ICD-10-CM

## 2022-11-29 DIAGNOSIS — E11.9 INSULIN DEPENDENT TYPE 2 DIABETES MELLITUS: Chronic | ICD-10-CM

## 2022-11-29 RX ORDER — BLOOD-GLUCOSE SENSOR
1 EACH MISCELLANEOUS
Qty: 6 EACH | Refills: 1 | Status: SHIPPED | OUTPATIENT
Start: 2022-11-29

## 2022-12-16 RX ORDER — CHLORTHALIDONE 25 MG/1
TABLET ORAL
Qty: 90 TABLET | Refills: 1 | OUTPATIENT
Start: 2022-12-16

## 2022-12-19 RX ORDER — VALSARTAN 320 MG/1
320 TABLET ORAL DAILY
Qty: 90 TABLET | Refills: 0 | Status: SHIPPED | OUTPATIENT
Start: 2022-12-19 | End: 2023-03-27

## 2022-12-19 RX ORDER — CHLORTHALIDONE 25 MG/1
25 TABLET ORAL DAILY
Qty: 90 TABLET | Refills: 0 | Status: SHIPPED | OUTPATIENT
Start: 2022-12-19 | End: 2023-03-27

## 2022-12-27 RX ORDER — ROSUVASTATIN CALCIUM 20 MG/1
TABLET, COATED ORAL
Qty: 90 TABLET | Refills: 0 | OUTPATIENT
Start: 2022-12-27

## 2022-12-27 RX ORDER — CHLORTHALIDONE 25 MG/1
TABLET ORAL
Qty: 90 TABLET | Refills: 0 | OUTPATIENT
Start: 2022-12-27

## 2023-01-24 DIAGNOSIS — Z79.4 INSULIN DEPENDENT TYPE 2 DIABETES MELLITUS: Primary | ICD-10-CM

## 2023-01-24 DIAGNOSIS — E11.9 INSULIN DEPENDENT TYPE 2 DIABETES MELLITUS: Primary | ICD-10-CM

## 2023-01-24 RX ORDER — INSULIN ASPART 100 [IU]/ML
15 INJECTION, SOLUTION INTRAVENOUS; SUBCUTANEOUS
Qty: 40.5 ML | Refills: 0 | Status: SHIPPED | OUTPATIENT
Start: 2023-01-24 | End: 2023-03-27 | Stop reason: SDUPTHER

## 2023-01-26 ENCOUNTER — TELEPHONE (OUTPATIENT)
Dept: ENDOCRINOLOGY | Age: 58
End: 2023-01-26
Payer: COMMERCIAL

## 2023-01-26 NOTE — TELEPHONE ENCOUNTER
[facility] pharmacy in [place] called stating novolog was not covered by insurance but Humalog and if it was okay to switch to that brand.     Voiced that it was okay, due to patient having no allergies.

## 2023-02-06 RX ORDER — METOPROLOL SUCCINATE 200 MG/1
200 TABLET, EXTENDED RELEASE ORAL DAILY
Qty: 90 TABLET | Refills: 0 | Status: SHIPPED | OUTPATIENT
Start: 2023-02-06

## 2023-02-06 RX ORDER — APIXABAN 5 MG/1
TABLET, FILM COATED ORAL
Qty: 86 TABLET | Refills: 0 | OUTPATIENT
Start: 2023-02-06

## 2023-02-06 RX ORDER — ROSUVASTATIN CALCIUM 20 MG/1
20 TABLET, COATED ORAL DAILY
Qty: 90 TABLET | Refills: 0 | Status: SHIPPED | OUTPATIENT
Start: 2023-02-06

## 2023-02-06 RX ORDER — AMLODIPINE BESYLATE 10 MG/1
10 TABLET ORAL DAILY
Qty: 90 TABLET | Refills: 0 | Status: SHIPPED | OUTPATIENT
Start: 2023-02-06

## 2023-02-06 NOTE — TELEPHONE ENCOUNTER
LOV  11/3/22      FOLLOW UP  8/7/23        MEDICATION MATCHES LOV    
vitamin D 25OH level and supplement prn

## 2023-03-03 ENCOUNTER — TELEMEDICINE (OUTPATIENT)
Dept: ENDOCRINOLOGY | Age: 58
End: 2023-03-03
Payer: COMMERCIAL

## 2023-03-03 DIAGNOSIS — E11.9 INSULIN DEPENDENT TYPE 2 DIABETES MELLITUS: Primary | Chronic | ICD-10-CM

## 2023-03-03 DIAGNOSIS — I10 ESSENTIAL HYPERTENSION: ICD-10-CM

## 2023-03-03 DIAGNOSIS — Z79.4 INSULIN DEPENDENT TYPE 2 DIABETES MELLITUS: Primary | Chronic | ICD-10-CM

## 2023-03-03 DIAGNOSIS — E78.2 MIXED HYPERLIPIDEMIA: ICD-10-CM

## 2023-03-03 PROCEDURE — 99214 OFFICE O/P EST MOD 30 MIN: CPT | Performed by: NURSE PRACTITIONER

## 2023-03-03 RX ORDER — INSULIN LISPRO 100 [IU]/ML
INJECTION, SOLUTION INTRAVENOUS; SUBCUTANEOUS
COMMUNITY
End: 2023-03-27 | Stop reason: SDUPTHER

## 2023-03-03 NOTE — PROGRESS NOTES
Chief Complaint  Chief Complaint   Patient presents with   • Diabetes       You have chosen to receive care through a telehealth visit.  Do you consent to use a video/audio connection for your medical care today? Yes     Subjective          History of Present Illness    Peter Crook 58 y.o. presents for a follow-up evaluation for type 2 DM     He has been diabetic since 09/2021  When patient was hospitalized for diabetes he saw a diabetic educator.     Pt is on the following medications for their DM: Levemir 25 units 2 times daily and Humalog 15 units 3 times daily before meals      Denies chest pain, shortness of breath, vision changes or numbness and tingling in feet/hands.    Pt does not have a history of DM retinopathy.  Last eye exam was 10/22     Pt does not have a history of nephropathy.  Patient is currently taking ARB     Pt does not have neuropathy.     Pt does not have a history of CAD or CVA.    Last A1C in 11/12/22 was 5.8 - never got labs from [facility] per patient     Last microalbumin in 07/22 was negative          Blood Sugars    Blood glucoses are checked via [facility] - [facility] is going to [facility]    Fasting blood glucoses: 120s    Blood glucoses: after breakfast 180, pre lunch 125, after lunch  200, pre dinner around 120s and after dinner around 180    Pt has rare episodes of hypoglycemia.            Hyperlipidemia     Pt denies any muscle/body aches, chest pain or shortness of breath    Pt is currently taking Crestor 20 mg HS    Last lipid panel in 11/22 showed Total 147, HDL 42, LDL 71 and Triglycerides 203            Hypertension     Pt has A. Fib     Pt denies any chest pain, palpitations, shortness of breath, or headache     Current regimen includes amlodipine 10 mg daily, chlorthalidone 25 mg daily, valsartan 320 mg daily and metoprolol succinate 200 mg daily     Pt follows with cardiology                 Other History     Pt had adenocarcinoma of prostate - had prostatectomy and radiation  treatment due to being in lymphnode          I have reviewed the patient's allergies, medicines, past medical hx, family hx and social hx.    Objective   Vital Signs:   There were no vitals taken for this visit.      Physical Exam   Alert and oriented x 3.  No apparent distress.                Results Review:   Hemoglobin A1C   Date Value Ref Range Status   07/28/2022 5.8 (H) 4.8 - 5.6 % Final     Comment:              Prediabetes: 5.7 - 6.4           Diabetes: >6.4           Glycemic control for adults with diabetes: <7.0   07/21/2022 5.5 4.3 - 5.6 % Final     Comment:     (note)  A1C% Reference Range:  4.3 - 5.6  Normal range  5.7 - 6.4  Pre-diabetic -increased risk for developing diabetes  mellitus.  >=6.5      Diabetic -diagnostic of diabetes mellitus.    Note: For diagnosis of diabetes in individuals without unequivocal  hyperglycemia, results should be confirmed by repeat testing.  Patients with conditions that shorten erythrocyte survival, such as  recovery from acute blood loss, hemolytic anemia, kidney disease,  or the presence of unstable hemogloblins like HbSS, HbCC, and HbSC  may yield falsely decreased HbA1c test results. Iron deficiency may  yield falsely increased HbA1c test results.     Total Cholesterol   Date Value Ref Range Status   12/20/2021 156 0 - 200 mg/dL Final     Triglycerides   Date Value Ref Range Status   11/10/2022 203 (H) 0 - 149 mg/dL Final   12/20/2021 209 (H) 0 - 150 mg/dL Final     HDL Cholesterol   Date Value Ref Range Status   11/10/2022 42 >39 mg/dL Final   12/20/2021 42 40 - 60 mg/dL Final     LDL Chol Calc (NIH)   Date Value Ref Range Status   11/10/2022 71 0 - 99 mg/dL Final     VLDL Cholesterol Milton   Date Value Ref Range Status   11/10/2022 34 5 - 40 mg/dL Final     LDL/HDL Ratio   Date Value Ref Range Status   12/20/2021 1.72  Final         Assessment and Plan {CC Problem List  Visit Diagnosis  ROS  Review (Popup)  Health Maintenance  Quality  BestPractice   Medications  SmartSets  SnapShot Encounters  Media :23  Diagnoses and all orders for this visit:    1. Insulin dependent type 2 diabetes mellitus (HCC) (Primary)  -     Fructosamine; Future  -     Hemoglobin A1c; Future  -     Comprehensive Metabolic Panel; Future    Continue with Levemir 25 units 2 times daily and   Change to taking Humalog 15 units before breakfast, 17 units before lunch and 15 units before dinner  Continue with Gabriel      2. Mixed hyperlipidemia  -     Comprehensive Metabolic Panel; Future  -     Lipid Panel; Future    Continue with statin        3. Essential hypertension  -     Comprehensive Metabolic Panel; Future    Continue with current medication regimen  Defer management to PCP        Labs within the next couple of weeks  RTC in 4 months with me and 8 months with Dr. Manjarrez      Follow Up     Patient was given instructions and counseling regarding her condition or for health maintenance advice. Please see specific information pulled into the AVS if appropriate.       Total time of discussion was 10 minutes.          BERONICA Romeo  03/03/23

## 2023-03-27 RX ORDER — VALSARTAN 320 MG/1
TABLET ORAL
Qty: 90 TABLET | Refills: 3 | Status: SHIPPED | OUTPATIENT
Start: 2023-03-27

## 2023-03-27 RX ORDER — INSULIN LISPRO 100 [IU]/ML
INJECTION, SOLUTION INTRAVENOUS; SUBCUTANEOUS
Qty: 15 ML | Refills: 1 | Status: SHIPPED | OUTPATIENT
Start: 2023-03-27

## 2023-03-27 RX ORDER — INSULIN DETEMIR 100 [IU]/ML
INJECTION, SOLUTION SUBCUTANEOUS
Qty: 45 ML | Refills: 2 | Status: SHIPPED | OUTPATIENT
Start: 2023-03-27 | End: 2023-03-27 | Stop reason: SDUPTHER

## 2023-03-27 RX ORDER — APIXABAN 5 MG/1
TABLET, FILM COATED ORAL
Qty: 86 TABLET | Refills: 3 | Status: SHIPPED | OUTPATIENT
Start: 2023-03-27

## 2023-03-27 RX ORDER — INSULIN DETEMIR 100 [IU]/ML
25 INJECTION, SOLUTION SUBCUTANEOUS 2 TIMES DAILY
Qty: 45 ML | Refills: 1 | Status: SHIPPED | OUTPATIENT
Start: 2023-03-27 | End: 2023-06-25

## 2023-03-27 RX ORDER — CHLORTHALIDONE 25 MG/1
TABLET ORAL
Qty: 90 TABLET | Refills: 3 | Status: SHIPPED | OUTPATIENT
Start: 2023-03-27

## 2023-03-27 NOTE — TELEPHONE ENCOUNTER
Levemir was last prescribed by pcp    Novolog prescribed by historical provider.    Patient is back and forth for work from [place] but would like his prescription sent to the [facility] there due to cheaper cost.

## 2023-04-26 ENCOUNTER — OFFICE VISIT (OUTPATIENT)
Dept: CARDIOLOGY | Facility: CLINIC | Age: 58
End: 2023-04-26
Payer: COMMERCIAL

## 2023-04-26 VITALS
HEIGHT: 76 IN | HEART RATE: 76 BPM | DIASTOLIC BLOOD PRESSURE: 86 MMHG | WEIGHT: 265 LBS | BODY MASS INDEX: 32.27 KG/M2 | SYSTOLIC BLOOD PRESSURE: 130 MMHG

## 2023-04-26 DIAGNOSIS — E78.2 MIXED HYPERLIPIDEMIA: ICD-10-CM

## 2023-04-26 DIAGNOSIS — I42.2 ASYMMETRIC SEPTAL HYPERTROPHY: ICD-10-CM

## 2023-04-26 DIAGNOSIS — I10 ESSENTIAL HYPERTENSION: ICD-10-CM

## 2023-04-26 DIAGNOSIS — I48.0 PAROXYSMAL ATRIAL FIBRILLATION: Primary | ICD-10-CM

## 2023-04-26 PROBLEM — I51.7 ASYMMETRIC SEPTAL HYPERTROPHY: Status: ACTIVE | Noted: 2023-04-26

## 2023-04-26 PROCEDURE — 99214 OFFICE O/P EST MOD 30 MIN: CPT | Performed by: INTERNAL MEDICINE

## 2023-04-26 RX ORDER — METOPROLOL SUCCINATE 200 MG/1
200 TABLET, EXTENDED RELEASE ORAL DAILY
Qty: 90 TABLET | Refills: 3 | Status: SHIPPED | OUTPATIENT
Start: 2023-04-26

## 2023-04-26 RX ORDER — AMLODIPINE BESYLATE 10 MG/1
10 TABLET ORAL DAILY
Qty: 90 TABLET | Refills: 3 | Status: SHIPPED | OUTPATIENT
Start: 2023-04-26

## 2023-04-26 RX ORDER — ROSUVASTATIN CALCIUM 20 MG/1
20 TABLET, COATED ORAL DAILY
Qty: 90 TABLET | Refills: 3 | Status: SHIPPED | OUTPATIENT
Start: 2023-04-26

## 2023-04-26 NOTE — ASSESSMENT & PLAN NOTE
Echocardiogram done last year showed mild asymmetric septal hypertrophy.  There is no evidence of left ventricular outflow tract obstruction or gradient.  He does not have systolic anterior motion of the mitral valve.  Mr. Crook does not have hypertrophic cardiomyopathy.  Asymmetric septal hypertrophy is a manifestation of hypertensive heart disease in this patient.    He has no symptoms pertaining to this finding at this time.  He was never been diagnosed with ventricular tachycardia.  He never had syncopal episodes.  He is not on any specific medications, other than antihypertensives.  No cardiac murmur is audible on auscultation.  Other than routine antihypertensive regimen, no specific testing is needed.  Routine surveillance echocardiogram is not indicated as well.    He has no history of sudden cardiac death in first-degree relatives.  Nobody in the family has been diagnosed with hypertrophic cardiomyopathy.    HCM risk for sudden cardiac death calculation is not applicable in this situation since patient does not have hypertrophic cardiomyopathy.

## 2023-04-26 NOTE — PROGRESS NOTES
CARDIOLOGY FOLLOW-UP PROGRESS NOTE        Chief Complaint  Atrial Fibrillation and Hypertension (Follow-up)    Subjective            Peter Crook presents to [facility] CARDIOLOGY  History of Present Illness    Mr. Crook is here for routine 9-month follow-up visit.  He denies any new symptoms.  Intermittently, he feels a fluttering sensation in the chest lasting less than 5 minutes at a time.  These episodes are infrequent.  Denies any dizziness or syncopal episodes.  He denies any chest pain.  He is very active at baseline.  No recent hospitalizations or ER visits.  He is taking all the medications including Eliquis as prescribed.  There are no bleeding problems.      Past History:    Paroxysmal atrial fibrillation  Essential hypertension  Mixed hyperlipidemia  Ectasia of the ascending aorta    Medical History:  Past Medical History:   Diagnosis Date   • Adenocarcinoma of prostate     negetive prostate biopsy march 2019    • Atrial fibrillation    • Bladder diverticulum    • ED (erectile dysfunction)    • Hyperlipidemia    • Spermatocele        Surgical History: has a past surgical history that includes TURP / transurethral incision / drainage prostate (02/11/2020); Bladder diverticulectomy; Vasectomy; Appendectomy; Prostatectomy; Hydrocelectomy; cystoscopy bladder biopsy (N/A, 10/15/2021); and Penile prosthesis implant (07/20/2022).     Family History: family history includes Cancer in his father; Hypertension in his father; No Known Problems in his brother, cousin, daughter, maternal grandfather, maternal grandmother, mother, paternal grandfather, paternal grandmother, sister, son, and another family member.     Social History: reports that he has quit smoking. His smoking use included cigarettes. He has a 32.00 pack-year smoking history. He has never used smokeless tobacco. He reports that he does not currently use alcohol. He reports that he does not use drugs.    Allergies: Patient has no  "known allergies.    Current Outpatient Medications on File Prior to Visit   Medication Sig   • chlorthalidone (HYGROTON) 25 MG tablet Take 1 tablet by mouth once daily   • Continuous Blood Gluc Sensor (FreeStyle Gabriel 3 Sensor) misc 1 each Every 14 (Fourteen) Days.   • Eliquis 5 MG tablet tablet TAKE 1 TABLET BY MOUTH EVERY 12 HOURS   • Glucagon, rDNA, (Glucagon Emergency) 1 MG kit Inject 1 kit as directed Daily As Needed (hypoglycemia requiring assistance). E11.65   • insulin detemir (Levemir FlexTouch) 100 UNIT/ML injection Inject 25 Units under the skin into the appropriate area as directed 2 (Two) Times a Day for 90 days.   • Insulin Lispro, 1 Unit Dial, (HumaLOG KwikPen) 100 UNIT/ML solution pen-injector Humalog 15 units before breakfast, 17 units before lunch and 15 units before dinner   • Insulin Pen Needle (Pen Needles) 31G X 5 MM misc Inject 1 each under the skin into the appropriate area as directed Daily With Breakfast, Lunch & Dinner.   • Multiple Vitamins-Minerals (EMERGEN-C VITAMIN C PO) Take 1 tablet by mouth Daily.   • potassium chloride ER (K-TAB) 20 MEQ tablet controlled-release ER tablet Take 1 tablet by mouth Daily.   • valsartan (DIOVAN) 320 MG tablet Take 1 tablet by mouth once daily   •  amLODIPine (NORVASC) 10 MG tablet Take 1 tablet by mouth Daily.   • metoprolol succinate XL (TOPROL-XL) 200 MG 24 hr tablet Take 1 tablet by mouth Daily.   • rosuvastatin (CRESTOR) 20 MG tablet Take 1 tablet by mouth Daily.         Review of Systems   Respiratory: Negative for cough, shortness of breath and wheezing.    Cardiovascular: Positive for palpitations. Negative for chest pain and leg swelling.   Gastrointestinal: Negative for nausea and vomiting.   Neurological: Negative for dizziness and syncope.        Objective     /86   Pulse 76   Ht 193 cm (76\")   Wt 120 kg (265 lb)   BMI 32.26 kg/m²       Physical Exam    General : Alert, awake, no acute distress  Neck : Supple, no carotid bruit, no " jugular venous distention  CVS : Regular rate and rhythm, no murmur, rubs or gallops  Lungs: Clear to auscultation bilaterally, no crackles or rhonchi  Abdomen: Soft, nontender, bowel sounds heard in all 4 quadrants  Extremities: Warm, well-perfused, no pedal edema    Result Review :     The following data was reviewed by: Neel Sullivan MD on 04/26/2023:    CMP        7/28/2022    08:45   CMP   Glucose 116     BUN 22     Creatinine 0.93     Sodium 142     Potassium 3.7     Chloride 99     Calcium 9.9     Total Protein 7.5     Albumin 4.7     Globulin 2.8     Total Bilirubin 0.3     Alkaline Phosphatase 112     AST (SGOT) 29     ALT (SGPT) 47     BUN/Creatinine Ratio 24       CBC        7/12/2022    12:46 7/21/2022    04:28   CBC   WBC 6.46      7.94        RBC 4.47      4.19        Hemoglobin 11.6      11.0        Hematocrit 36.1      33.8        MCV 80.8      80.7        MCH 26.0      26.3        MCHC 32.1      32.5        RDW 14.3      14.1        Platelets 211      167            This result is from an external source.     TSH        7/28/2022    08:45   TSH   TSH 1.100       Lipid Panel        7/28/2022    08:45 11/10/2022    11:14   Lipid Panel   Total Cholesterol 158   147     Triglycerides 181   203     HDL Cholesterol 39   42     VLDL Cholesterol 31   34     LDL Cholesterol  88   71            Data reviewed: Cardiology studies        Results for orders placed during the hospital encounter of 05/09/22    Adult Transthoracic Echo Complete W/ Cont if Necessary Per Protocol    Interpretation Summary  · Left ventricular ejection fraction appears to be 56 - 60%. Left ventricular systolic function is normal.  · Left ventricular wall thickness is consistent with mild septal asymmetric hypertrophy.  · Left ventricular diastolic function is consistent with (grade I) impaired relaxation.  · There are no significant valvular abnormalities.      Results for orders placed in visit on 09/28/22    Treadmill Stress  Test    Interpretation Summary  · Exercise treadmill stress test is negative for ischemia.  · Good exercise tolerance noted. Patient reached more than 100% of the maximum predicted heart rate.  · There was no chest pain at max exercise.  · There were no significant arrhythmias.  · There were no ischemic EKG changes at maximum exercise.    24 Holter monitor study done on 9/29/2022 showed    1.  The underlying rhythm was sinus with the maximum heart rate at 134 bpm and the minimum was 46 bpm.  2.  No malignant arrhythmias were noted  3.  A few episodes of asymptomatic sinus tachycardia in the range of 102 to 134 bpm  4.  There was a brief episode of atrial fibrillation at a rate of 100-129 bpm (lasted for 42 seconds)  5.  A few episodes of sinus tachycardia in the range of 102-134 bpm.  6.  Rare PACs (a total of 121 beats were recorded).    7.  There were no pauses  8.  There were 0 patient reported events.               Assessment and Plan        Diagnoses and all orders for this visit:    1. Paroxysmal atrial fibrillation (HCC) (Primary)  Assessment & Plan:  His symptoms are very well controlled.  He rarely gets palpitations.  Recent 24-hour Holter monitor study showed mostly sinus rhythm with a 42-second run of atrial fibrillation.  We will continue Eliquis for anticoagulation, along with metoprolol for rate and rhythm management    Orders:  -     metoprolol succinate XL (TOPROL-XL) 200 MG 24 hr tablet; Take 1 tablet by mouth Daily.  Dispense: 90 tablet; Refill: 3    2. Essential hypertension  Assessment & Plan:  Blood pressure is well controlled.  Recent echocardiogram showed evidence of hypertensive heart disease.  We will continue amlodipine, chlorthalidone, metoprolol and valsartan at the current dose.  Most recent labs showed stable electrolytes and renal function.    Orders:  -     amLODIPine (NORVASC) 10 MG tablet; Take 1 tablet by mouth Daily.  Dispense: 90 tablet; Refill: 3  -     metoprolol succinate XL  (TOPROL-XL) 200 MG 24 hr tablet; Take 1 tablet by mouth Daily.  Dispense: 90 tablet; Refill: 3    3. Asymmetric septal hypertrophy  Assessment & Plan:  Echocardiogram done last year showed mild asymmetric septal hypertrophy.  There is no evidence of left ventricular outflow tract obstruction or gradient.  He does not have systolic anterior motion of the mitral valve.  Mr. Crook does not have hypertrophic cardiomyopathy.  Asymmetric septal hypertrophy is a manifestation of hypertensive heart disease in this patient.    He has no symptoms pertaining to this finding at this time.  He was never been diagnosed with ventricular tachycardia.  He never had syncopal episodes.  He is not on any specific medications, other than antihypertensives.  No cardiac murmur is audible on auscultation.  Other than routine antihypertensive regimen, no specific testing is needed.  Routine surveillance echocardiogram is not indicated as well.    He has no history of sudden cardiac death in first-degree relatives.  Nobody in the family has been diagnosed with hypertrophic cardiomyopathy.    HCM risk for sudden cardiac death calculation is not applicable in this situation since patient does not have hypertrophic cardiomyopathy.      4. Mixed hyperlipidemia  Assessment & Plan:  Most recent LDL is 71, which is at goal.  Continue rosuvastatin 20 mg daily.    Orders:  -     rosuvastatin (CRESTOR) 20 MG tablet; Take 1 tablet by mouth Daily.  Dispense: 90 tablet; Refill: 3            Follow Up     Return in about 9 months (around 1/26/2024) for Next scheduled follow up.    Patient was given instructions and counseling regarding his condition or for health maintenance advice. Please see specific information pulled into the AVS if appropriate.

## 2023-04-26 NOTE — ASSESSMENT & PLAN NOTE
Blood pressure is well controlled.  Recent echocardiogram showed evidence of hypertensive heart disease.  We will continue amlodipine, chlorthalidone, metoprolol and valsartan at the current dose.  Most recent labs showed stable electrolytes and renal function.

## 2023-04-26 NOTE — ASSESSMENT & PLAN NOTE
His symptoms are very well controlled.  He rarely gets palpitations.  Recent 24-hour Holter monitor study showed mostly sinus rhythm with a 42-second run of atrial fibrillation.  We will continue Eliquis for anticoagulation, along with metoprolol for rate and rhythm management

## 2023-07-10 ENCOUNTER — TELEPHONE (OUTPATIENT)
Dept: ENDOCRINOLOGY | Age: 58
End: 2023-07-10

## 2023-07-10 NOTE — TELEPHONE ENCOUNTER
Hub staff attempted to follow warm transfer process and was unsuccessful     Caller: Peter Crook    Relationship to patient: Self    Best call back number: [phone]    Patient is needing: PT CALLED STATING THAT HE IS OUT OF TOWN AND CAN'T MAKE HIS APPT TODAY AT 3:30 AND HE THOUGHT IT WAS A VIDEO VISIT BUT IT IS IN OFFICE. PATIENT IS REQUESTING A RESCHEDULED VIDEO VISIT

## 2023-07-14 PROBLEM — Z79.4 TYPE 2 DIABETES MELLITUS WITH HYPOGLYCEMIA WITHOUT COMA, WITH LONG-TERM CURRENT USE OF INSULIN: Status: ACTIVE | Noted: 2023-07-14

## 2023-07-14 PROBLEM — E11.649 TYPE 2 DIABETES MELLITUS WITH HYPOGLYCEMIA WITHOUT COMA, WITH LONG-TERM CURRENT USE OF INSULIN: Status: ACTIVE | Noted: 2023-07-14

## 2023-08-14 DIAGNOSIS — E78.2 MIXED HYPERLIPIDEMIA: ICD-10-CM

## 2023-08-14 DIAGNOSIS — I10 ESSENTIAL HYPERTENSION: ICD-10-CM

## 2023-08-14 RX ORDER — AMLODIPINE BESYLATE 10 MG/1
10 TABLET ORAL DAILY
Qty: 90 TABLET | Refills: 3 | Status: CANCELLED | OUTPATIENT
Start: 2023-08-14

## 2023-08-15 DIAGNOSIS — I10 ESSENTIAL HYPERTENSION: ICD-10-CM

## 2023-08-15 RX ORDER — AMLODIPINE BESYLATE 10 MG/1
10 TABLET ORAL DAILY
Qty: 90 TABLET | Refills: 3 | Status: SHIPPED | OUTPATIENT
Start: 2023-08-15

## 2023-08-15 RX ORDER — VALSARTAN 320 MG/1
320 TABLET ORAL DAILY
Qty: 90 TABLET | Refills: 3 | Status: SHIPPED | OUTPATIENT
Start: 2023-08-15

## 2023-08-15 RX ORDER — ROSUVASTATIN CALCIUM 20 MG/1
20 TABLET, COATED ORAL DAILY
Qty: 90 TABLET | Refills: 3 | Status: SHIPPED | OUTPATIENT
Start: 2023-08-15

## 2023-12-04 DIAGNOSIS — I48.0 PAROXYSMAL ATRIAL FIBRILLATION: Primary | ICD-10-CM

## 2024-01-23 ENCOUNTER — TELEMEDICINE (OUTPATIENT)
Dept: SLEEP MEDICINE | Facility: HOSPITAL | Age: 59
End: 2024-01-23
Payer: COMMERCIAL

## 2024-01-23 DIAGNOSIS — G47.33 OSA ON CPAP: Primary | ICD-10-CM

## 2024-01-23 DIAGNOSIS — Z02.89 ENCOUNTER FOR FEDERAL AVIATION ADMINISTRATION (FAA) EXAMINATION: ICD-10-CM

## 2024-01-23 PROCEDURE — 99214 OFFICE O/P EST MOD 30 MIN: CPT | Performed by: FAMILY MEDICINE

## 2024-01-23 NOTE — PROGRESS NOTES
Follow Up Sleep Disorders Center Note     Chief Complaint:  JUAN    This visit was completed via video.  Telehealth visit with audio component.  All issues as below were discussed and addressed but no physical exam was performed.  If it was felt that the patient should be evaluated in clinic then they were directed there.  The patient verbally consented to visit.    Location: [place]     Primary Care Physician: Jennifer Mckeon DO    Interval History:   The patient is a 59 y.o. male  who was last seen in the sleep lab: 9/14/2022.  New patient to me.  Presents today for follow-up on JUAN and have forms filled out for FAA.  Sleep apnea is mild with AHI 9.3.  Patient is on auto CPAP 4-10.  No problems with mask machine hypersomnia or nonrestorative sleep.    Downloaded PAP Data Reviewed For Compliance:  DME is [employer].  Downloads between 12/1/2023 - 1/9/2024.  Average usage is 7 hours 36 minutes.  Average AHI is 1.3.  Average auto CPAP pressure is 9.8    I have reviewed the above results and compared them with the patient's last downloads and reviewed with the patient.    Review of Systems:    A complete review of systems was done and all were negative with the exception of all negative    Social History:    Social History     Socioeconomic History    Marital status:    Tobacco Use    Smoking status: Former     Packs/day: 2.00     Years: 16.00     Additional pack years: 0.00     Total pack years: 32.00     Types: Cigarettes    Smokeless tobacco: Never   Vaping Use    Vaping Use: Never used   Substance and Sexual Activity    Alcohol use: Not Currently    Drug use: Never    Sexual activity: Defer       Allergies:  Patient has no known allergies.     Medication Review:  Reviewed.      Impression:   1. JUAN on CPAP    2. Encounter for [employer] (FAA) examination        Obstructive sleep apnea adequately treated with auto PAP 4-10. The patient appears to be at goal with good compliance and usage. The  patient has no complaints of hypersomnolence.    Plan:  Good sleep hygiene measures should be maintained.      After evaluating the patient and assessing results available, the patient is benefiting from the treatment being provided.     The patient will continue PAP.  After clinical evaluation and review of downloads, I recommend no changes to the patient's pressures.  A new prescription will be sent to the patient's DME.    Caution during activities that require prolonged concentration is strongly advised if sleepiness returns. Changing of PAP supplies regularly is important for effective use. Patient needs to change cushion on the mask or plugs on nasal pillows along with disposable filters once every month and change mask frame, tubing, headgear and Velcro straps every 6 months at the minimum.    FAA clearance. Patient has a history of sleep apnea which is being treated with auto CPAP.  I have seen the patient today and had a conversation.  I have also reviewed the smartcard download from the device which shows good compliance.  Patient's symptoms have resolved with the use of the device.  Patient does not have any excessive daytime sleepiness and is also getting adequate sleep as per the download.  The residual AHI is acceptable.  I have encourage the patient to continue to use the device as it is benefiting the patient in treating sleep apnea and also controlling symptoms.      I answered all of the patient's questions.  The patient will call for any problems and will follow up in 1 year.      Noah Cordova MD  Sleep Medicine  01/23/24  09:48 EST

## 2024-01-29 ENCOUNTER — TELEPHONE (OUTPATIENT)
Dept: SLEEP MEDICINE | Facility: HOSPITAL | Age: 59
End: 2024-01-29
Payer: COMMERCIAL

## 2024-01-29 NOTE — TELEPHONE ENCOUNTER
Call to pt, left message to let him know that his FAA papers are already filled out (1/23/24) and attached to his [employer] account.  Asked pt to call our office if he is unable to see or print his paperwork.

## 2024-02-26 ENCOUNTER — TELEPHONE (OUTPATIENT)
Dept: CARDIOLOGY | Facility: CLINIC | Age: 59
End: 2024-02-26
Payer: COMMERCIAL

## 2024-02-26 NOTE — TELEPHONE ENCOUNTER
BERTHA patient.Went over stress test with patient. Patient verbalized everything he needs from the stress test is [facility]. Patient is awaiting for Holter monitor results.

## 2024-02-26 NOTE — TELEPHONE ENCOUNTER
----- Message from BERONICA Mackenzie sent at 2/26/2024  9:10 AM EST -----  Stress test results were normal, without any evidence of ischemia.  Patient may need a copy of this for his  licensure.

## 2024-03-01 ENCOUNTER — OFFICE VISIT (OUTPATIENT)
Dept: CARDIOLOGY | Facility: CLINIC | Age: 59
End: 2024-03-01
Payer: COMMERCIAL

## 2024-03-01 VITALS
HEIGHT: 76 IN | WEIGHT: 245.6 LBS | DIASTOLIC BLOOD PRESSURE: 78 MMHG | BODY MASS INDEX: 29.91 KG/M2 | HEART RATE: 74 BPM | SYSTOLIC BLOOD PRESSURE: 125 MMHG

## 2024-03-01 DIAGNOSIS — E78.2 MIXED HYPERLIPIDEMIA: ICD-10-CM

## 2024-03-01 DIAGNOSIS — I48.0 PAROXYSMAL ATRIAL FIBRILLATION: Primary | ICD-10-CM

## 2024-03-01 DIAGNOSIS — I10 ESSENTIAL HYPERTENSION: ICD-10-CM

## 2024-03-01 DIAGNOSIS — I42.2 ASYMMETRIC SEPTAL HYPERTROPHY: ICD-10-CM

## 2024-03-01 PROCEDURE — 99214 OFFICE O/P EST MOD 30 MIN: CPT | Performed by: FAMILY MEDICINE

## 2024-03-01 RX ORDER — SEMAGLUTIDE 0.68 MG/ML
0.5 INJECTION, SOLUTION SUBCUTANEOUS
COMMUNITY
Start: 2024-02-26 | End: 2025-02-26

## 2024-03-01 RX ORDER — DILTIAZEM HYDROCHLORIDE 120 MG/1
120 CAPSULE, EXTENDED RELEASE ORAL DAILY
Qty: 90 CAPSULE | Refills: 3 | Status: SHIPPED | OUTPATIENT
Start: 2024-03-01

## 2024-03-01 NOTE — ASSESSMENT & PLAN NOTE
Previous echocardiogram showed mild asymmetrical septal hypertrophy.  He does not have hypertrophic cardiomyopathy.  There was no evidence of left ventricular outflow tract obstruction or gradient.  Echocardiogram did not demonstrate any systolic anterior motion of mitral valve.  The asymmetrical septal hypertrophy is  a manifestation of hypertensive heart disease for Mr. Crook.  He has no symptoms pertaining to the finding, and has never been diagnosed with ventricular tachycardia, had any syncopal episodes, and is not on any medications to treat outside of antihypertensives.  He has no cardiac murmur audible with auscultation.  He does not require any further testing, and simply needs routine care with antihypertensive medications.  There is no indication for routine surveillance with echocardiogram for this finding.    He has no history of sudden cardiac death in first-degree relatives.  Nobody in the family has been diagnosed with hypertrophic cardiomyopathy.     HCM risk for sudden cardiac death calculation is not applicable in this situation since patient does not have hypertrophic cardiomyopathy.

## 2024-03-01 NOTE — ASSESSMENT & PLAN NOTE
Blood pressure is well-controlled.  He had previous echocardiogram which does demonstrate some evidence of hypertensive heart disease.  We will continue his current regiment with valsartan, metoprolol, and chlorthalidone.   amlodipine will be discontinued he will be switched to diltiazem for better control of atrial fibrillation.  Previous labs show normal electrolyte and renal function, we will repeat monitoring before next routine follow-up appointment.

## 2024-03-01 NOTE — PROGRESS NOTES
Chief Complaint  Atrial Fibrillation and Follow-up    Subjective        History of Present Illness  Peter Crook presents to [facility] CARDIOLOGY   Mr. Crook is a 59-year-old male patient coming in today for atrial fibrillation and hypertension.  Reports he is doing well, only rarely has fluttering sensation, and are very brief in nature.  He does not have any complaints of chest pains, dizziness or lightheadedness.  Blood pressure remains well-controlled.  He has good compliance with his medications.  He recently underwent treadmill stress test and Holter monitor study for routine monitoring as part of his  licensure.  Results below.      Past History:     Paroxysmal atrial fibrillation  Essential hypertension  Mixed hyperlipidemia  Ectasia of the ascending aorta    Past Medical History:   Diagnosis Date    Adenocarcinoma of prostate     Atrial fibrillation     Bladder diverticulum     ED (erectile dysfunction)     Hyperlipidemia     Spermatocele        No Known Allergies     Past Surgical History:   Procedure Laterality Date    APPENDECTOMY      BLADDER DIVERTICULECTOMY      july 2020     CYSTOSCOPY BLADDER BIOPSY N/A 10/15/2021    Procedure: CYSTOSCOPY;  BLADDER NECK BIOPSY; FULGURATION OF BLADDER NECK;  Surgeon: Filiberto Sykes MD;  Location: [facility];  Service: Urology;  Laterality: N/A;    HYDROCELECTOMY      PENILE PROSTHESIS IMPLANT  07/20/2022    URINARY SPHINCTER INSERTION ( Dr Gonzalez from [facility])    PROSTATECTOMY      TURP / TRANSURETHRAL INCISION / DRAINAGE PROSTATE  02/11/2020    VASECTOMY          Social History  He  reports that he has quit smoking. His smoking use included cigarettes. He has a 32.00 pack-year smoking history. He has never used smokeless tobacco. He reports that he does not currently use alcohol. He reports that he does not use drugs.    Family History  His family history includes Cancer in his father; Hypertension in his father; No Known  Problems in his brother, cousin, daughter, maternal grandfather, maternal grandmother, mother, paternal grandfather, paternal grandmother, sister, son, and another family member.       Current Outpatient Medications on File Prior to Visit   Medication Sig    apixaban (Eliquis) 5 MG tablet tablet Take 1 tablet by mouth Every 12 (Twelve) Hours.    chlorthalidone (HYGROTON) 25 MG tablet Take 1 tablet by mouth once daily    Continuous Blood Gluc Sensor (FreeStyle Gabriel 3 Sensor) misc 1 each Every 14 (Fourteen) Days.    Glucagon, rDNA, (Glucagon Emergency) 1 MG kit Inject 1 kit as directed Daily As Needed (hypoglycemia requiring assistance). E11.65    metoprolol succinate XL (TOPROL-XL) 200 MG 24 hr tablet Take 1 tablet by mouth Daily.    Multiple Vitamins-Minerals (EMERGEN-C VITAMIN C PO) Take 1 tablet by mouth Daily.    potassium chloride ER (K-TAB) 20 MEQ tablet controlled-release ER tablet Take 1 tablet by mouth Daily.    rosuvastatin (CRESTOR) 20 MG tablet Take 1 tablet by mouth Daily.    Semaglutide,0.25 or 0.5MG/DOS, (Ozempic, 0.25 or 0.5 MG/DOSE,) 2 MG/3ML solution pen-injector Inject 0.5 mg under the skin into the appropriate area as directed.    valsartan (DIOVAN) 320 MG tablet Take 1 tablet by mouth Daily.    [DISCONTINUED] amLODIPine (NORVASC) 10 MG tablet Take 1 tablet by mouth Daily.    Insulin Glargine, 1 Unit Dial, (Toujeo SoloStar) 300 UNIT/ML solution pen-injector injection Inject 50 Units under the skin into the appropriate area as directed Daily. (Patient not taking: Reported on 3/1/2024)    Insulin Lispro, 1 Unit Dial, (HumaLOG KwikPen) 100 UNIT/ML solution pen-injector Humalog 15 units before breakfast, 17 units before lunch and 15 units before dinner (Patient not taking: Reported on 3/1/2024)    Insulin Pen Needle (Pen Needles) 31G X 5 MM misc Inject 1 each under the skin into the appropriate area as directed Daily With Breakfast, Lunch & Dinner. (Patient not taking: Reported on 3/1/2024)     No  "current facility-administered medications on file prior to visit.         Review of Systems   Constitutional:  Negative for fatigue.   Respiratory:  Negative for cough, chest tightness and shortness of breath.    Cardiovascular:  Positive for palpitations (Rarely). Negative for chest pain and leg swelling.   Gastrointestinal:  Negative for nausea and vomiting.   Neurological:  Negative for dizziness and syncope.        Objective   Vitals:    03/01/24 0852 03/01/24 0921   BP: 136/84 125/78   Pulse: 73 74   Weight: 111 kg (245 lb 9.6 oz)    Height: 193 cm (76\")          Physical Exam  General : Alert, awake, no acute distress  Neck : Supple, no carotid bruit, no jugular venous distention  CVS : Regular rate and rhythm, no murmur, rubs or gallops  Lungs: Clear to auscultation bilaterally, no crackles or rhonchi  Abdomen: Soft, nontender, bowel sounds active  Extremities: Warm, well-perfused, no pedal edema      Result Review     The following data was reviewed by BERONICA Mackenzie    CMP          7/5/2023    08:51   CMP   Glucose 136    BUN 21    Creatinine 0.86    Sodium 141    Potassium 4.1    Chloride 100    Calcium 9.8    Total Protein 7.0    Albumin 4.8    Globulin 2.2    Total Bilirubin 0.5    Alkaline Phosphatase 111    AST (SGOT) 28    ALT (SGPT) 43    BUN/Creatinine Ratio 24         Lab Results   Component Value Date    TSH 1.100 07/28/2022        Magnesium   Date Value Ref Range Status   09/22/2021 2.0 1.6 - 2.6 mg/dL Final           Lipid Panel          7/5/2023    08:51   Lipid Panel   Total Cholesterol 147    Triglycerides 321    HDL Cholesterol 36    VLDL Cholesterol 50    LDL Cholesterol  61        Results for orders placed during the hospital encounter of 05/09/22    Adult Transthoracic Echo Complete W/ Cont if Necessary Per Protocol    Interpretation Summary  · Left ventricular ejection fraction appears to be 56 - 60%. Left ventricular systolic function is normal.  · Left ventricular wall thickness " is consistent with mild septal asymmetric hypertrophy.  · Left ventricular diastolic function is consistent with (grade I) impaired relaxation.  · There are no significant valvular abnormalities.    Results for orders placed in visit on 02/22/24    Treadmill Stress Test    Interpretation Summary    Exercise treadmill stress test is negative for ischemia.    Good exercise tolerance noted.  Maximum workload achieved was 11 METS.    There was no chest pain at maximal exercise.  There were no arrhythmias.    EKG at peak heart rate showed no diagnostic ischemic changes.      Holter monitor from 2/22/2024    An abnormal 24-hour Holter monitor study.    Predominant underlying rhythm is paroxysmal atrial fibrillation with intermittent episodes of normal sinus rhythm.  Total A-fib burden was 61%.    There were several episodes of atrial fibrillation with rapid ventricular rates.  Average heart rate during the entire study was 105 bpm.            Assessment and Plan   Diagnoses and all orders for this visit:    1. Paroxysmal atrial fibrillation (Primary)  Assessment & Plan:  Recently underwent Holter monitor study for routine surveillance due to  licensure.  Symptomatically he is doing well, and only rarely has very brief sensation of palpitation.  However recent Holter monitor study showed increased atrial fibrillation burden of 61%, with several episodes of rapid ventricular rate, and an average heart rate of 105.  We will continue metoprolol, and switch amlodipine to diltiazem for better rate and rhythm control.  Continue anticoagulation with Eliquis.  He has no signs or symptoms of bleeding.    Orders:  -     dilTIAZem XR (DILACOR XR) 120 MG 24 hr capsule; Take 1 capsule by mouth Daily.  Dispense: 90 capsule; Refill: 3  -     CBC (No Diff); Future  -     Comprehensive Metabolic Panel; Future    2. Mixed hyperlipidemia  -     Comprehensive Metabolic Panel; Future  -     Lipid Panel; Future    3. Essential  hypertension  Assessment & Plan:  Blood pressure is well-controlled.  He had previous echocardiogram which does demonstrate some evidence of hypertensive heart disease.  We will continue his current regiment with valsartan, metoprolol, and chlorthalidone.   amlodipine will be discontinued he will be switched to diltiazem for better control of atrial fibrillation.  Previous labs show normal electrolyte and renal function, we will repeat monitoring before next routine follow-up appointment.    Orders:  -     Comprehensive Metabolic Panel; Future    4. Asymmetric septal hypertrophy  Assessment & Plan:    Previous echocardiogram showed mild asymmetrical septal hypertrophy.  He does not have hypertrophic cardiomyopathy.  There was no evidence of left ventricular outflow tract obstruction or gradient.  Echocardiogram did not demonstrate any systolic anterior motion of mitral valve.  The asymmetrical septal hypertrophy is  a manifestation of hypertensive heart disease for Mr. Crook.  He has no symptoms pertaining to the finding, and has never been diagnosed with ventricular tachycardia, had any syncopal episodes, and is not on any medications to treat outside of antihypertensives.  He has no cardiac murmur audible with auscultation.  He does not require any further testing, and simply needs routine care with antihypertensive medications.  There is no indication for routine surveillance with echocardiogram for this finding.    He has no history of sudden cardiac death in first-degree relatives.  Nobody in the family has been diagnosed with hypertrophic cardiomyopathy.     HCM risk for sudden cardiac death calculation is not applicable in this situation since patient does not have hypertrophic cardiomyopathy.          [id number] Score: 2         Follow Up   Return in about 4 months (around 7/1/2024) for with Dr. Sullivan.    Patient was given instructions and counseling regarding his condition or for health maintenance  advice. Please see specific information pulled into the AVS if appropriate.     Signed,  Amy Hickey, BERONICA  03/01/2024     Dictated Utilizing Dragon Dictation: Please note that portions of this note were completed with a voice recognition program.  Part of this note may be an electronic transcription/translation of spoken language to printed text using the Dragon Dictation System.

## 2024-03-01 NOTE — ASSESSMENT & PLAN NOTE
Recently underwent Holter monitor study for routine surveillance due to  licensure.  Symptomatically he is doing well, and only rarely has very brief sensation of palpitation.  However recent Holter monitor study showed increased atrial fibrillation burden of 61%, with several episodes of rapid ventricular rate, and an average heart rate of 105.  We will continue metoprolol, and switch amlodipine to diltiazem for better rate and rhythm control.  Continue anticoagulation with Eliquis.  He has no signs or symptoms of bleeding.

## 2024-03-09 ENCOUNTER — PATIENT MESSAGE (OUTPATIENT)
Dept: CARDIOLOGY | Facility: CLINIC | Age: 59
End: 2024-03-09
Payer: COMMERCIAL

## 2024-03-11 RX ORDER — CHLORTHALIDONE 25 MG/1
25 TABLET ORAL DAILY
Qty: 90 TABLET | Refills: 1 | Status: SHIPPED | OUTPATIENT
Start: 2024-03-11

## 2024-03-22 ENCOUNTER — TRANSCRIBE ORDERS (OUTPATIENT)
Dept: ADMINISTRATIVE | Facility: HOSPITAL | Age: 59
End: 2024-03-22
Payer: COMMERCIAL

## 2024-03-22 DIAGNOSIS — C61 PROSTATE CANCER: Primary | ICD-10-CM

## 2024-04-04 ENCOUNTER — TELEPHONE (OUTPATIENT)
Dept: CARDIOLOGY | Facility: CLINIC | Age: 59
End: 2024-04-04
Payer: COMMERCIAL

## 2024-04-04 DIAGNOSIS — I48.0 PAROXYSMAL ATRIAL FIBRILLATION: Primary | ICD-10-CM

## 2024-04-12 ENCOUNTER — OFFICE VISIT (OUTPATIENT)
Dept: CARDIOLOGY | Facility: CLINIC | Age: 59
End: 2024-04-12
Payer: COMMERCIAL

## 2024-04-12 VITALS
WEIGHT: 247.2 LBS | SYSTOLIC BLOOD PRESSURE: 171 MMHG | BODY MASS INDEX: 30.1 KG/M2 | DIASTOLIC BLOOD PRESSURE: 99 MMHG | HEIGHT: 76 IN | HEART RATE: 71 BPM

## 2024-04-12 DIAGNOSIS — I10 ESSENTIAL HYPERTENSION: Primary | ICD-10-CM

## 2024-04-12 DIAGNOSIS — I48.0 PAROXYSMAL ATRIAL FIBRILLATION: ICD-10-CM

## 2024-04-12 PROCEDURE — 99213 OFFICE O/P EST LOW 20 MIN: CPT | Performed by: FAMILY MEDICINE

## 2024-04-12 RX ORDER — DILTIAZEM HYDROCHLORIDE 240 MG/1
240 CAPSULE, EXTENDED RELEASE ORAL DAILY
Qty: 90 CAPSULE | Refills: 1 | Status: SHIPPED | OUTPATIENT
Start: 2024-04-12 | End: 2024-04-19

## 2024-04-12 RX ORDER — METFORMIN HYDROCHLORIDE 500 MG/1
TABLET, EXTENDED RELEASE ORAL
COMMUNITY
Start: 2024-03-09

## 2024-04-12 NOTE — PROGRESS NOTES
Chief Complaint  Atrial Fibrillation, Hypertension, and Follow-up    Subjective        History of Present Illness  Peter Crook presents to [facility] CARDIOLOGY   Mr. Crook is a 59-year-old male patient coming in today for follow-up of atrial fibrillation and hypertension.  At previous visit amlodipine was discontinued and he was switched to diltiazem for better control of atrial fibrillation.  Since that time his blood pressure has been creeping up.   Previous Holter head showed recurring episodes of atrial fibrillation, and he has had Holter monitor applied this morning so we can monitor for any further episodes after medication adjustment was made as required for   licensure.  He has no symptoms of palpitations, chest pains or shortness of breath.  Blood pressure has been elevated.  Reports good compliance with medication.    Past History:     Paroxysmal atrial fibrillation  Essential hypertension  Mixed hyperlipidemia  Ectasia of the ascending aorta         Past Medical History:   Diagnosis Date    Adenocarcinoma of prostate     Atrial fibrillation     Bladder diverticulum     ED (erectile dysfunction)     Hyperlipidemia     Spermatocele        No Known Allergies     Past Surgical History:   Procedure Laterality Date    APPENDECTOMY      BLADDER DIVERTICULECTOMY      july 2020     CYSTOSCOPY BLADDER BIOPSY N/A 10/15/2021    Procedure: CYSTOSCOPY;  BLADDER NECK BIOPSY; FULGURATION OF BLADDER NECK;  Surgeon: Filiberto Sykes MD;  Location: [facility];  Service: Urology;  Laterality: N/A;    HYDROCELECTOMY      PENILE PROSTHESIS IMPLANT  07/20/2022    URINARY SPHINCTER INSERTION ( Dr Gonzalez from [facility])    PROSTATECTOMY      TURP / TRANSURETHRAL INCISION / DRAINAGE PROSTATE  02/11/2020    VASECTOMY          Social History  He  reports that he has quit smoking. His smoking use included cigarettes. He has a 32 pack-year smoking history. He has never used smokeless tobacco. He  "reports that he does not currently use alcohol. He reports that he does not use drugs.    Family History  His family history includes Cancer in his father; Hypertension in his father; No Known Problems in his brother, cousin, daughter, maternal grandfather, maternal grandmother, mother, paternal grandfather, paternal grandmother, sister, son, and another family member.       Current Outpatient Medications on File Prior to Visit   Medication Sig    apixaban (Eliquis) 5 MG tablet tablet Take 1 tablet by mouth Every 12 (Twelve) Hours.    chlorthalidone (HYGROTON) 25 MG tablet Take 1 tablet by mouth Daily.    Continuous Blood Gluc Sensor (FreeStyle Gabriel 3 Sensor) misc 1 each Every 14 (Fourteen) Days.    Glucagon, rDNA, (Glucagon Emergency) 1 MG kit Inject 1 kit as directed Daily As Needed (hypoglycemia requiring assistance). E11.65    metFORMIN ER (GLUCOPHAGE-XR) 500 MG 24 hr tablet TAKE 2 TABLETS BY MOUTH BEFORE BREAKFAST AND SUPPER. DO NOT CRUSH, CHEW, OR SPLIT    Multiple Vitamins-Minerals (EMERGEN-C VITAMIN C PO) Take 1 tablet by mouth Daily.    potassium chloride ER (K-TAB) 20 MEQ tablet controlled-release ER tablet Take 1 tablet by mouth Daily.    rosuvastatin (CRESTOR) 20 MG tablet Take 1 tablet by mouth Daily.     No current facility-administered medications on file prior to visit.         Review of Systems   Constitutional:  Negative for fatigue.   Respiratory:  Negative for cough, chest tightness and shortness of breath.    Cardiovascular:  Negative for chest pain, palpitations and leg swelling.   Gastrointestinal:  Negative for nausea and vomiting.   Neurological:  Negative for dizziness and syncope.        Objective   Vitals:    04/12/24 0859   BP: 171/99   Pulse: 71   Weight: 112 kg (247 lb 3.2 oz)   Height: 193 cm (76\")         Physical Exam  General : Alert, awake, no acute distress  Neck : Supple, no carotid bruit, no jugular venous distention  CVS : Regular rate and rhythm, no murmur, rubs or " "gallops  Lungs: Clear to auscultation bilaterally, no crackles or rhonchi  Abdomen: Soft, nontender, bowel sounds active  Extremities: Warm, well-perfused, no pedal edema      Result Review     The following data was reviewed by BERONICA Mackenzie  No results found for: \"PROBNP\"  CMP          7/5/2023    08:51 5/22/2024    09:35   CMP   Glucose 136     BUN 21     Creatinine 0.86  0.80    EGFR  102.6    Sodium 141     Potassium 4.1     Chloride 100     Calcium 9.8     Total Protein 7.0     Albumin 4.8     Globulin 2.2     Total Bilirubin 0.5     Alkaline Phosphatase 111     AST (SGOT) 28     ALT (SGPT) 43     BUN/Creatinine Ratio 24          Lab Results   Component Value Date    TSH 1.100 07/28/2022      No results found for: \"FREET4\"   No results found for: \"DDIMERQUANT\"  Magnesium   Date Value Ref Range Status   09/22/2021 2.0 1.6 - 2.6 mg/dL Final      No results found for: \"DIGOXIN\"   No results found for: \"TROPONINT\"        Lipid Panel          7/5/2023    08:51   Lipid Panel   Total Cholesterol 147    Triglycerides 321    HDL Cholesterol 36    VLDL Cholesterol 50    LDL Cholesterol  61        Results for orders placed during the hospital encounter of 05/09/22    Adult Transthoracic Echo Complete W/ Cont if Necessary Per Protocol    Interpretation Summary  · Left ventricular ejection fraction appears to be 56 - 60%. Left ventricular systolic function is normal.  · Left ventricular wall thickness is consistent with mild septal asymmetric hypertrophy.  · Left ventricular diastolic function is consistent with (grade I) impaired relaxation.  · There are no significant valvular abnormalities.    Results for orders placed in visit on 02/22/24    Treadmill Stress Test    Interpretation Summary    Exercise treadmill stress test is negative for ischemia.    Good exercise tolerance noted.  Maximum workload achieved was 11 METS.    There was no chest pain at maximal exercise.  There were no arrhythmias.    EKG at peak " heart rate showed no diagnostic ischemic changes.      Holter monitor from 2/22/2024    An abnormal 24-hour Holter monitor study.    Predominant underlying rhythm is paroxysmal atrial fibrillation with intermittent episodes of normal sinus rhythm.  Total A-fib burden was 61%.    There were several episodes of atrial fibrillation with rapid ventricular rates.  Average heart rate during the entire study was 105 bpm.     Assessment and Plan   Diagnoses and all orders for this visit:    1. Essential hypertension (Primary)  Assessment & Plan:  Pressure has been better controlled, however we recently made transition from amlodipine over to diltiazem for better control of atrial fibrillation.  Will go ahead and increase dose of diltiazem, continue current doses of valsartan, metoprolol, and chlorthalidone.  Continue to monitor home blood pressure closely for overall control.      2. Paroxysmal atrial fibrillation  Assessment & Plan:  Holter monitor study completed in February showed A-fib burden of 61%.  At that time medication adjustments were made for better control.  He is currently regular on exam, and denies any recent episodes of palpitations.  Will go ahead and see what repeat Holter study shows in terms of atrial fibrillation burden.  In the meantime continue current dose of metoprolol, and we will increase dose of diltiazem for better blood pressure control.  Continue chronic anticoagulation with Eliquis.    Orders:  -     Discontinue: dilTIAZem XR (DILACOR XR) 240 MG 24 hr capsule; Take 1 capsule by mouth Daily.  Dispense: 90 capsule; Refill: 1            Follow Up   Return for with Dr. Sullivan, As already scheduled.    Patient was given instructions and counseling regarding his condition or for health maintenance advice. Please see specific information pulled into the AVS if appropriate.     Signed,  Amy Hickey, APRN  04/12/2024     Dictated Utilizing Dragon Dictation: Please note that portions of this note  were completed with a voice recognition program.  Part of this note may be an electronic transcription/translation of spoken language to printed text using the Dragon Dictation System.

## 2024-04-19 RX ORDER — DILTIAZEM HYDROCHLORIDE 360 MG/1
360 CAPSULE, EXTENDED RELEASE ORAL DAILY
Qty: 90 CAPSULE | Refills: 3 | Status: SHIPPED | OUTPATIENT
Start: 2024-04-19

## 2024-04-19 NOTE — PROGRESS NOTES
Called pt w/ results of holter study, and notes blood pressure remains elevated, generally aroun 150's over .  Recommend to increase ditizaem to 360 and continue to monitor bp and heart rate.

## 2024-04-19 NOTE — ASSESSMENT & PLAN NOTE
Pressure has been better controlled, however we recently made transition from amlodipine over to diltiazem for better control of atrial fibrillation.  Will go ahead and increase dose of diltiazem, continue current doses of valsartan, metoprolol, and chlorthalidone.  Continue to monitor home blood pressure closely for overall control.

## 2024-04-19 NOTE — ASSESSMENT & PLAN NOTE
Holter monitor study completed in February showed A-fib burden of 61%.  At that time medication adjustments were made for better control.  He is currently regular on exam, and denies any recent episodes of palpitations.  Will go ahead and see what repeat Holter study shows in terms of atrial fibrillation burden.  In the meantime continue current dose of metoprolol, and we will increase dose of diltiazem for better blood pressure control.  Continue chronic anticoagulation with Eliquis.

## 2024-04-23 ENCOUNTER — PATIENT MESSAGE (OUTPATIENT)
Dept: CARDIOLOGY | Facility: CLINIC | Age: 59
End: 2024-04-23
Payer: COMMERCIAL

## 2024-04-24 NOTE — TELEPHONE ENCOUNTER
[facility].  WAS ADVISED DILTIAZEM PA IS NOT NEEDED AND INS WILL COVER 30 DAY SUPPLY.  PT NOTIFIED.

## 2024-04-29 ENCOUNTER — PATIENT MESSAGE (OUTPATIENT)
Dept: CARDIOLOGY | Facility: CLINIC | Age: 59
End: 2024-04-29
Payer: COMMERCIAL

## 2024-04-29 DIAGNOSIS — I48.0 PAROXYSMAL ATRIAL FIBRILLATION: ICD-10-CM

## 2024-04-29 DIAGNOSIS — I10 ESSENTIAL HYPERTENSION: ICD-10-CM

## 2024-04-29 RX ORDER — METOPROLOL SUCCINATE 200 MG/1
200 TABLET, EXTENDED RELEASE ORAL DAILY
Qty: 90 TABLET | Refills: 3 | Status: SHIPPED | OUTPATIENT
Start: 2024-04-29

## 2024-05-22 ENCOUNTER — HOSPITAL ENCOUNTER (OUTPATIENT)
Dept: NUCLEAR MEDICINE | Facility: HOSPITAL | Age: 59
Discharge: HOME | End: 2024-05-22
Payer: COMMERCIAL

## 2024-05-22 ENCOUNTER — HOSPITAL ENCOUNTER (OUTPATIENT)
Dept: CT IMAGING | Facility: HOSPITAL | Age: 59
Discharge: HOME | End: 2024-05-22
Admitting: NURSE PRACTITIONER
Payer: COMMERCIAL

## 2024-05-22 DIAGNOSIS — C61 PROSTATE CANCER: ICD-10-CM

## 2024-05-22 LAB
CREAT BLDA-MCNC: 0.8 MG/DL (ref 0.6–1.3)
EGFRCR SERPLBLD CKD-EPI 2021: 102.6 ML/MIN/1.73

## 2024-05-22 PROCEDURE — 71260 CT THORAX DX C+: CPT

## 2024-05-22 PROCEDURE — 78306 BONE IMAGING WHOLE BODY: CPT

## 2024-05-22 PROCEDURE — A9503 TC99M MEDRONATE: HCPCS | Performed by: NURSE PRACTITIONER

## 2024-05-22 PROCEDURE — 25510000001 IOPAMIDOL PER 1 ML: Performed by: NURSE PRACTITIONER

## 2024-05-22 PROCEDURE — 82565 ASSAY OF CREATININE: CPT

## 2024-05-22 PROCEDURE — 0 TECHNETIUM MEDRONATE KIT: Performed by: NURSE PRACTITIONER

## 2024-05-22 PROCEDURE — 74177 CT ABD & PELVIS W/CONTRAST: CPT

## 2024-05-22 RX ORDER — TC 99M MEDRONATE 20 MG/10ML
30 INJECTION, POWDER, LYOPHILIZED, FOR SOLUTION INTRAVENOUS
Status: COMPLETED | OUTPATIENT
Start: 2024-05-22 | End: 2024-05-22

## 2024-05-22 RX ORDER — OLMESARTAN MEDOXOMIL 40 MG/1
40 TABLET ORAL DAILY
Qty: 90 TABLET | Refills: 1 | Status: SHIPPED | OUTPATIENT
Start: 2024-05-22

## 2024-05-22 RX ADMIN — TC 99M MEDRONATE 30 MILLICURIE: 20 INJECTION, POWDER, LYOPHILIZED, FOR SOLUTION INTRAVENOUS at 09:34

## 2024-05-22 RX ADMIN — IOPAMIDOL 100 ML: 755 INJECTION, SOLUTION INTRAVENOUS at 09:54

## 2024-06-05 RX ORDER — HYDRALAZINE HYDROCHLORIDE 25 MG/1
25 TABLET, FILM COATED ORAL 2 TIMES DAILY
Qty: 180 TABLET | Refills: 1 | Status: SHIPPED | OUTPATIENT
Start: 2024-06-05

## 2024-08-09 DIAGNOSIS — E78.2 MIXED HYPERLIPIDEMIA: ICD-10-CM

## 2024-08-12 RX ORDER — ROSUVASTATIN CALCIUM 20 MG/1
20 TABLET, COATED ORAL DAILY
Qty: 90 TABLET | Refills: 1 | Status: SHIPPED | OUTPATIENT
Start: 2024-08-12

## 2024-08-30 ENCOUNTER — OFFICE VISIT (OUTPATIENT)
Dept: CARDIOLOGY | Facility: CLINIC | Age: 59
End: 2024-08-30
Payer: COMMERCIAL

## 2024-08-30 VITALS
HEART RATE: 66 BPM | WEIGHT: 251.4 LBS | HEIGHT: 76 IN | SYSTOLIC BLOOD PRESSURE: 150 MMHG | BODY MASS INDEX: 30.61 KG/M2 | DIASTOLIC BLOOD PRESSURE: 84 MMHG

## 2024-08-30 DIAGNOSIS — I42.2 ASYMMETRIC SEPTAL HYPERTROPHY: ICD-10-CM

## 2024-08-30 DIAGNOSIS — I10 ESSENTIAL HYPERTENSION: ICD-10-CM

## 2024-08-30 DIAGNOSIS — I48.0 PAROXYSMAL ATRIAL FIBRILLATION: Primary | ICD-10-CM

## 2024-08-30 PROCEDURE — 99214 OFFICE O/P EST MOD 30 MIN: CPT | Performed by: FAMILY MEDICINE

## 2024-08-30 RX ORDER — HYDRALAZINE HYDROCHLORIDE 50 MG/1
50 TABLET, FILM COATED ORAL 2 TIMES DAILY
Qty: 180 TABLET | Refills: 3 | Status: SHIPPED | OUTPATIENT
Start: 2024-08-30

## 2024-08-30 NOTE — PROGRESS NOTES
Chief Complaint  Atrial Fibrillation, Hypertension, and Follow-up    Subjective        History of Present Illness  Peter Crook presents to [facility] CARDIOLOGY   Mr. Crook is a 59-year-old male coming in today for routine cardiac follow-up for atrial fibrillation and hypertension.  He reports he is doing well.  No complaints of palpitations, no known r recent recurrences of atrial fibrillation.  Blood pressure continues to run a little on the elevated side.  Since last office visit, he was switched to olmesartan, and hydralazine was added.  Reports good compliance with medication.  He did inquire about the possibility of coming off Eliquis.  Denies any side effects or bleeding issues, but wondered if he would be required to take this lifelong.      Past History:     Paroxysmal atrial fibrillation  Essential hypertension  Mixed hyperlipidemia  Ectasia of the ascending aorta    Past Medical History:   Diagnosis Date    Adenocarcinoma of prostate     Atrial fibrillation     Bladder diverticulum     ED (erectile dysfunction)     Hyperlipidemia     Spermatocele        No Known Allergies     Past Surgical History:   Procedure Laterality Date    APPENDECTOMY      BLADDER DIVERTICULECTOMY      july 2020     CYSTOSCOPY BLADDER BIOPSY N/A 10/15/2021    Procedure: CYSTOSCOPY;  BLADDER NECK BIOPSY; FULGURATION OF BLADDER NECK;  Surgeon: Filiberto Sykes MD;  Location: [facility];  Service: Urology;  Laterality: N/A;    HYDROCELECTOMY      PENILE PROSTHESIS IMPLANT  07/20/2022    URINARY SPHINCTER INSERTION ( Dr Gonzalez from [facility])    PROSTATECTOMY      TURP / TRANSURETHRAL INCISION / DRAINAGE PROSTATE  02/11/2020    VASECTOMY          Social History  He  reports that he has quit smoking. His smoking use included cigarettes. He has a 32 pack-year smoking history. He has never used smokeless tobacco. He reports that he does not currently use alcohol. He reports that he does not use  "drugs.    Family History  His family history includes Cancer in his father; Hypertension in his father; No Known Problems in his brother, cousin, daughter, maternal grandfather, maternal grandmother, mother, paternal grandfather, paternal grandmother, sister, son, and another family member.       Current Outpatient Medications on File Prior to Visit   Medication Sig    apixaban (Eliquis) 5 MG tablet tablet Take 1 tablet by mouth Every 12 (Twelve) Hours.    chlorthalidone (HYGROTON) 25 MG tablet Take 1 tablet by mouth Daily.    Continuous Blood Gluc Sensor (FreeStyle Gabriel 3 Sensor) misc 1 each Every 14 (Fourteen) Days.    dilTIAZem CD (CARDIZEM CD) 360 MG 24 hr capsule Take 1 capsule by mouth Daily.    Glucagon, rDNA, (Glucagon Emergency) 1 MG kit Inject 1 kit as directed Daily As Needed (hypoglycemia requiring assistance). E11.65    metFORMIN ER (GLUCOPHAGE-XR) 500 MG 24 hr tablet TAKE 2 TABLETS BY MOUTH BEFORE BREAKFAST AND SUPPER. DO NOT CRUSH, CHEW, OR SPLIT    metoprolol succinate XL (TOPROL-XL) 200 MG 24 hr tablet Take 1 tablet by mouth Daily.    Multiple Vitamins-Minerals (EMERGEN-C VITAMIN C PO) Take 1 tablet by mouth Daily.    olmesartan (Benicar) 40 MG tablet Take 1 tablet by mouth Daily.    potassium chloride ER (K-TAB) 20 MEQ tablet controlled-release ER tablet Take 1 tablet by mouth Daily.    rosuvastatin (CRESTOR) 20 MG tablet Take 1 tablet by mouth once daily    [DISCONTINUED] hydrALAZINE (APRESOLINE) 25 MG tablet Take 1 tablet by mouth 2 (Two) Times a Day.     No current facility-administered medications on file prior to visit.         Review of Systems     Objective   Vitals:    08/30/24 0934   BP: 150/84   Pulse: 66   Weight: 114 kg (251 lb 6.4 oz)   Height: 193 cm (76\")         Physical Exam  General : Alert, awake, no acute distress  Neck : Supple, no carotid bruit, no jugular venous distention  CVS : Regular rate and rhythm, no murmur, no rubs or gallops  Lungs: Clear to auscultation " "bilaterally, no crackles or rhonchi  Abdomen: Soft, nontender, bowel sounds active  Extremities: Warm, well-perfused, no pedal edema      Result Review     The following data was reviewed by BERONICA Mackenzie  No results found for: \"PROBNP\"  CMP          5/22/2024    09:35   CMP   Creatinine 0.80    EGFR 102.6         Lab Results   Component Value Date    TSH 1.100 07/28/2022      No results found for: \"FREET4\"   No results found for: \"DDIMERQUANT\"  Magnesium   Date Value Ref Range Status   09/22/2021 2.0 1.6 - 2.6 mg/dL Final      No results found for: \"DIGOXIN\"   No results found for: \"TROPONINT\"            Results for orders placed during the hospital encounter of 05/09/22    Adult Transthoracic Echo Complete W/ Cont if Necessary Per Protocol    Interpretation Summary  · Left ventricular ejection fraction appears to be 56 - 60%. Left ventricular systolic function is normal.  · Left ventricular wall thickness is consistent with mild septal asymmetric hypertrophy.  · Left ventricular diastolic function is consistent with (grade I) impaired relaxation.  · There are no significant valvular abnormalities.    Results for orders placed in visit on 02/22/24    Treadmill Stress Test    Interpretation Summary    Exercise treadmill stress test is negative for ischemia.    Good exercise tolerance noted.  Maximum workload achieved was 11 METS.    There was no chest pain at maximal exercise.  There were no arrhythmias.    EKG at peak heart rate showed no diagnostic ischemic changes.         Holter monitor  4/4/2024    Essentially normal 24-hour Holter monitor study.    Underlying rhythm is normal sinus rhythm with an average heart rate of 60 bpm.  46% of the time was spent in sinus bradycardia.    There were no episodes of atrial fibrillation noted during the study.    Rare isolated PACs and PVCs noted.    There were no arrhythmias.  There were no long pauses.           Assessment and Plan   Diagnoses and all orders for " this visit:    1. Paroxysmal atrial fibrillation (Primary)  Assessment & Plan:  Last year in February he was having significant atrial fibrillation burden on Holter, medications were adjusted, and subsequent Holter did not show any reoccurrence of atrial fibrillation.  He is regular on exam, and has no complaints of palpitations.  Continue metoprolol and diltiazem for rate and rhythm control.  He has elevated [id number] score, and we discussed indication and reasoning for long-term anticoagulation management.  He is agreeable to continue Eliquis.          Orders:  -     Comprehensive Metabolic Panel; Future  -     CBC (No Diff); Future    2. Essential hypertension  Assessment & Plan:  Blood pressure still remains mildly elevated.  We will continue current doses of diltiazem, olmesartan, metoprolol, chlorthalidone, and increase hydralazine to 50 mg twice daily.  States he had recent labs with endocrinology but I do not have those available today.  We will recheck chemistries and electrolytes with his next routine lab draw.      3. Asymmetric septal hypertrophy  Assessment & Plan:  Previous echocardiogram showed mild asymmetrical septal hypertrophy.  He does not have hypertrophic cardiomyopathy.  There was no evidence of left ventricular outflow tract obstruction or gradient.  Echocardiogram did not demonstrate any systolic anterior motion of mitral valve.  The asymmetrical septal hypertrophy is  a manifestation of hypertensive heart disease for Mr. Crook.  He has no symptoms pertaining to the finding, and has never been diagnosed with ventricular tachycardia, had any syncopal episodes, and is not on any medications to treat outside of antihypertensives.  He has no cardiac murmur audible with auscultation.  He does not require any further testing, and simply needs routine care with antihypertensive medications.  There is no indication for routine surveillance with echocardiogram for this finding.     He has no  history of sudden cardiac death in first-degree relatives.  Nobody in the family has been diagnosed with hypertrophic cardiomyopathy.     HCM risk for sudden cardiac death calculation is not applicable in this situation since patient does not have hypertrophic cardiomyopathy.          Other orders  -     hydrALAZINE (APRESOLINE) 50 MG tablet; Take 1 tablet by mouth 2 (Two) Times a Day.  Dispense: 180 tablet; Refill: 3                Follow Up   Return in about 6 months (around 2/28/2025) for with Dr. Sullivan.    Patient was given instructions and counseling regarding his condition or for health maintenance advice. Please see specific information pulled into the AVS if appropriate.     Signed,  Amy Hickey, BERONICA  08/30/2024     Dictated Utilizing Dragon Dictation: Please note that portions of this note were completed with a voice recognition program.  Part of this note may be an electronic transcription/translation of spoken language to printed text using the Dragon Dictation System.

## 2024-08-30 NOTE — ASSESSMENT & PLAN NOTE
Last year in February he was having significant atrial fibrillation burden on Holter, medications were adjusted, and subsequent Holter did not show any reoccurrence of atrial fibrillation.  He is regular on exam, and has no complaints of palpitations.  Continue metoprolol and diltiazem for rate and rhythm control.  He has elevated [id number] score, and we discussed indication and reasoning for long-term anticoagulation management.  He is agreeable to continue Eliquis.

## 2024-08-30 NOTE — ASSESSMENT & PLAN NOTE
Blood pressure still remains mildly elevated.  We will continue current doses of diltiazem, olmesartan, metoprolol, chlorthalidone, and increase hydralazine to 50 mg twice daily.  States he had recent labs with endocrinology but I do not have those available today.  We will recheck chemistries and electrolytes with his next routine lab draw.

## 2024-09-09 RX ORDER — APIXABAN 5 MG/1
5 TABLET, FILM COATED ORAL EVERY 12 HOURS
Qty: 60 TABLET | Refills: 0 | Status: SHIPPED | OUTPATIENT
Start: 2024-09-09

## 2024-09-23 RX ORDER — CHLORTHALIDONE 25 MG/1
25 TABLET ORAL DAILY
Qty: 90 TABLET | Refills: 0 | Status: SHIPPED | OUTPATIENT
Start: 2024-09-23

## 2024-10-07 RX ORDER — APIXABAN 5 MG/1
5 TABLET, FILM COATED ORAL EVERY 12 HOURS
Qty: 180 TABLET | Refills: 3 | Status: SHIPPED | OUTPATIENT
Start: 2024-10-07

## 2024-11-10 NOTE — TELEPHONE ENCOUNTER
----- Message from Dalia Carias MA sent at 4/4/2024  8:56 AM EDT -----  Regarding: FW: FAA Wants Another 24 Hour Holter Monitor   Contact: [phone]    ----- Message -----  From: Peter Crook  Sent: 4/4/2024   8:39 AM EDT  To: [facility]  Subject: FAA Wants Another 24 Hour Holter Monitor         Please see attached [facility] letter requesting another holter monitor since my medication adjustments. Also attached is AFIB Status Summary Form I will need to completed.    I am currently grounded until this is completed. If possible, I would like to complete this test as soon as possible.    Sorry for being a pain.   No

## 2024-11-12 ENCOUNTER — APPOINTMENT (OUTPATIENT)
Dept: URBAN - NONMETROPOLITAN AREA CLINIC 54 | Age: 59
Setting detail: DERMATOLOGY
End: 2024-11-12

## 2024-11-12 DIAGNOSIS — Z85.828 PERSONAL HISTORY OF OTHER MALIGNANT NEOPLASM OF SKIN: ICD-10-CM

## 2024-11-12 DIAGNOSIS — L82.1 OTHER SEBORRHEIC KERATOSIS: ICD-10-CM

## 2024-11-12 DIAGNOSIS — L73.8 OTHER SPECIFIED FOLLICULAR DISORDERS: ICD-10-CM

## 2024-11-12 DIAGNOSIS — D22 MELANOCYTIC NEVI: ICD-10-CM

## 2024-11-12 DIAGNOSIS — L81.4 OTHER MELANIN HYPERPIGMENTATION: ICD-10-CM

## 2024-11-12 DIAGNOSIS — L72.8 OTHER FOLLICULAR CYSTS OF THE SKIN AND SUBCUTANEOUS TISSUE: ICD-10-CM

## 2024-11-12 PROBLEM — D22.4 MELANOCYTIC NEVI OF SCALP AND NECK: Status: ACTIVE | Noted: 2024-11-12

## 2024-11-12 PROBLEM — D22.5 MELANOCYTIC NEVI OF TRUNK: Status: ACTIVE | Noted: 2024-11-12

## 2024-11-12 PROCEDURE — OTHER MONITORING: OTHER

## 2024-11-12 PROCEDURE — 99203 OFFICE O/P NEW LOW 30 MIN: CPT

## 2024-11-12 PROCEDURE — OTHER COUNSELING: OTHER

## 2024-11-12 ASSESSMENT — LOCATION SIMPLE DESCRIPTION DERM
LOCATION SIMPLE: LEFT SHOULDER
LOCATION SIMPLE: LEFT UPPER BACK
LOCATION SIMPLE: RIGHT SHOULDER
LOCATION SIMPLE: RIGHT FOREHEAD
LOCATION SIMPLE: RIGHT EAR
LOCATION SIMPLE: POSTERIOR NECK
LOCATION SIMPLE: LEFT EAR

## 2024-11-12 ASSESSMENT — LOCATION ZONE DERM
LOCATION ZONE: ARM
LOCATION ZONE: NECK
LOCATION ZONE: FACE
LOCATION ZONE: EAR
LOCATION ZONE: TRUNK

## 2024-11-12 ASSESSMENT — LOCATION DETAILED DESCRIPTION DERM
LOCATION DETAILED: RIGHT FOREHEAD
LOCATION DETAILED: RIGHT SUPERIOR CRUS OF ANTIHELIX
LOCATION DETAILED: RIGHT POSTERIOR SHOULDER
LOCATION DETAILED: RIGHT POSTERIOR NECK
LOCATION DETAILED: LEFT SUPERIOR CRUS OF ANTIHELIX
LOCATION DETAILED: RIGHT SUPERIOR POSTERIOR NECK
LOCATION DETAILED: MID POSTERIOR NECK
LOCATION DETAILED: LEFT MEDIAL UPPER BACK
LOCATION DETAILED: LEFT SUPERIOR MEDIAL UPPER BACK
LOCATION DETAILED: LEFT POSTERIOR SHOULDER

## 2024-11-16 DIAGNOSIS — I10 ESSENTIAL HYPERTENSION: Primary | ICD-10-CM

## 2024-11-18 RX ORDER — OLMESARTAN MEDOXOMIL 40 MG/1
40 TABLET ORAL DAILY
Qty: 90 TABLET | Refills: 1 | Status: SHIPPED | OUTPATIENT
Start: 2024-11-18

## 2024-11-18 NOTE — TELEPHONE ENCOUNTER
Last OV:8/30/2024    Next  OV: 3/10/25    Medication matches last office note    
all other ROS negative except as per HPI

## 2024-11-23 ENCOUNTER — PATIENT MESSAGE (OUTPATIENT)
Dept: CARDIOLOGY | Facility: CLINIC | Age: 59
End: 2024-11-23
Payer: COMMERCIAL

## 2024-11-23 DIAGNOSIS — I10 ESSENTIAL HYPERTENSION: Primary | ICD-10-CM

## 2024-11-25 NOTE — TELEPHONE ENCOUNTER
Would recommend increasing dose of chlorthalidone from 25 mg daily to 50 mg daily.  Please check with him regarding pharmacy, [place] pharmacy.  Since we are changing dose of chlorthalidone recommend having a BMP drawn 2 to 4 weeks after making medication adjustment.  Adding coenzyme every 10 to his medication regiment will be fine.

## 2024-12-08 DIAGNOSIS — E78.2 MIXED HYPERLIPIDEMIA: ICD-10-CM

## 2024-12-09 RX ORDER — ROSUVASTATIN CALCIUM 20 MG/1
20 TABLET, COATED ORAL DAILY
Qty: 90 TABLET | Refills: 0 | OUTPATIENT
Start: 2024-12-09

## 2024-12-12 RX ORDER — CHLORTHALIDONE 25 MG/1
25 TABLET ORAL DAILY
Qty: 90 TABLET | Refills: 0 | Status: SHIPPED | OUTPATIENT
Start: 2024-12-12

## 2025-02-01 DIAGNOSIS — E78.2 MIXED HYPERLIPIDEMIA: ICD-10-CM

## 2025-02-03 RX ORDER — ROSUVASTATIN CALCIUM 20 MG/1
20 TABLET, COATED ORAL DAILY
Qty: 90 TABLET | Refills: 1 | Status: SHIPPED | OUTPATIENT
Start: 2025-02-03

## 2025-03-10 ENCOUNTER — OFFICE VISIT (OUTPATIENT)
Dept: CARDIOLOGY | Facility: CLINIC | Age: 60
End: 2025-03-10
Payer: COMMERCIAL

## 2025-03-10 VITALS
HEIGHT: 76 IN | WEIGHT: 253 LBS | SYSTOLIC BLOOD PRESSURE: 141 MMHG | HEART RATE: 68 BPM | DIASTOLIC BLOOD PRESSURE: 87 MMHG | BODY MASS INDEX: 30.81 KG/M2

## 2025-03-10 DIAGNOSIS — I48.0 PAROXYSMAL ATRIAL FIBRILLATION: Primary | ICD-10-CM

## 2025-03-10 DIAGNOSIS — E78.2 MIXED HYPERLIPIDEMIA: ICD-10-CM

## 2025-03-10 DIAGNOSIS — I77.810 ASCENDING AORTA DILATATION: ICD-10-CM

## 2025-03-10 DIAGNOSIS — I10 ESSENTIAL HYPERTENSION: ICD-10-CM

## 2025-03-10 PROCEDURE — 99214 OFFICE O/P EST MOD 30 MIN: CPT | Performed by: INTERNAL MEDICINE

## 2025-03-10 RX ORDER — HYDRALAZINE HYDROCHLORIDE 50 MG/1
50 TABLET, FILM COATED ORAL 3 TIMES DAILY
Qty: 270 TABLET | Refills: 3 | Status: SHIPPED | OUTPATIENT
Start: 2025-03-10

## 2025-03-11 NOTE — ASSESSMENT & PLAN NOTE
CT scan done in May, 2024 showed a ascending aortic dimension of 4.0, unchanged from previous years.  Antihypertensive medications being adjusted as mentioned above.

## 2025-03-11 NOTE — ASSESSMENT & PLAN NOTE
Symptoms are well-controlled with current dose of metoprolol and Cardizem CD.  Will continue Eliquis for anticoagulation.  Will do CBC and CMP now.

## 2025-03-11 NOTE — ASSESSMENT & PLAN NOTE
No recent lipid panel available for review.  Will repeat lipid panel with next lab draw and adjust dose of atorvastatin as needed.

## 2025-03-11 NOTE — ASSESSMENT & PLAN NOTE
Blood pressure is mildly elevated in the office today.  He reports having high diastolic blood pressure on a consistent basis at home.  Will increase the dose of hydralazine to 50 mg 3 times daily.  Continue chlorthalidone, olmesartan, metoprolol and Cardizem CD without changes.  Will continue potassium supplementation.  CMP to be done today.

## 2025-03-11 NOTE — PROGRESS NOTES
CARDIOLOGY FOLLOW-UP PROGRESS NOTE        Chief Complaint  Follow-up, Atrial Fibrillation, and Hypertension    Subjective            Peter Crook presents to [facility] CARDIOLOGY  History of Present Illness      Mr. Crook is here for a routine 6-month follow-up visit.  Last year, he had episodes of increased frequency of palpitations.  Holter monitor study showed 60% A-fib burden.  Amlodipine was changed to Cardizem CD and the dose was increased.  A repeat Holter study did not show any A-fib episodes.  He was also noted to have high blood pressure readings and antihypertensive medications were adjusted    Today, he reports feeling better.  Palpitations are very well-controlled.  Blood pressure is still on the higher side at home most of the time.  He has no chest pain.  No shortness of breath.  No bleeding problems.    Past History:    Paroxysmal atrial fibrillation  Essential hypertension  Mixed hyperlipidemia  Ectasia of the ascending aorta    Medical History:  Past Medical History:   Diagnosis Date    Adenocarcinoma of prostate     negetive prostate biopsy march 2019     Atrial fibrillation     Bladder diverticulum     ED (erectile dysfunction)     Hyperlipidemia     Spermatocele        Family History: family history includes Cancer in his father; Hypertension in his father; No Known Problems in his brother, cousin, daughter, maternal grandfather, maternal grandmother, mother, paternal grandfather, paternal grandmother, sister, son, and another family member.     Social History: reports that he has quit smoking. His smoking use included cigarettes. He has a 32 pack-year smoking history. He has never used smokeless tobacco. He reports that he does not currently use alcohol. He reports that he does not use drugs.    Allergies: Patient has no known allergies.    Current Outpatient Medications on File Prior to Visit   Medication Sig    apixaban (Eliquis) 5 MG tablet tablet TAKE 1 TABLET BY MOUTH  "EVERY 12 HOURS    chlorthalidone (HYGROTON) 25 MG tablet Take 1 tablet by mouth once daily    Continuous Blood Gluc Sensor (FreeStyle Gabriel 3 Sensor) misc 1 each Every 14 (Fourteen) Days.    dilTIAZem CD (CARDIZEM CD) 360 MG 24 hr capsule Take 1 capsule by mouth Daily.    Glucagon, rDNA, (Glucagon Emergency) 1 MG kit Inject 1 kit as directed Daily As Needed (hypoglycemia requiring assistance). E11.65    metFORMIN ER (GLUCOPHAGE-XR) 500 MG 24 hr tablet TAKE 2 TABLETS BY MOUTH BEFORE BREAKFAST AND SUPPER. DO NOT CRUSH, CHEW, OR SPLIT    metoprolol succinate XL (TOPROL-XL) 200 MG 24 hr tablet Take 1 tablet by mouth Daily.    Multiple Vitamins-Minerals (EMERGEN-C VITAMIN C PO) Take 1 tablet by mouth Daily.    olmesartan (BENICAR) 40 MG tablet Take 1 tablet by mouth once daily    potassium chloride ER (K-TAB) 20 MEQ tablet controlled-release ER tablet Take 1 tablet by mouth Daily.    rosuvastatin (CRESTOR) 20 MG tablet Take 1 tablet by mouth once daily     No current facility-administered medications on file prior to visit.          Review of Systems   Respiratory:  Negative for cough, shortness of breath and wheezing.    Cardiovascular:  Positive for palpitations. Negative for chest pain and leg swelling.   Gastrointestinal:  Negative for nausea and vomiting.   Neurological:  Negative for dizziness and syncope.        Objective     /87   Pulse 68   Ht 193 cm (76\")   Wt 115 kg (253 lb)   BMI 30.80 kg/m²       Physical Exam    General : Alert, awake, no acute distress  Neck : Supple, no carotid bruit, no jugular venous distention  CVS : Regular rate and rhythm, no murmur, rubs or gallops  Lungs: Clear to auscultation bilaterally, no crackles or rhonchi  Abdomen: Soft, nontender, bowel sounds heard in all 4 quadrants  Extremities: Warm, well-perfused, no pedal edema    Result Review :     The following data was reviewed by: Neel Sullivan MD on 03/10/2025:    CMP          5/22/2024    09:35   CMP   Creatinine " 0.80    EGFR 102.6          Data reviewed: Cardiology studies        Results for orders placed during the hospital encounter of 05/09/22    Adult Transthoracic Echo Complete W/ Cont if Necessary Per Protocol    Interpretation Summary  · Left ventricular ejection fraction appears to be 56 - 60%. Left ventricular systolic function is normal.  · Left ventricular wall thickness is consistent with mild septal asymmetric hypertrophy.  · Left ventricular diastolic function is consistent with (grade I) impaired relaxation.  · There are no significant valvular abnormalities.      Results for orders placed in visit on 02/22/24    Treadmill Stress Test    Interpretation Summary    Exercise treadmill stress test is negative for ischemia.    Good exercise tolerance noted.  Maximum workload achieved was 11 METS.    There was no chest pain at maximal exercise.  There were no arrhythmias.    EKG at peak heart rate showed no diagnostic ischemic changes.               Assessment and Plan        Diagnoses and all orders for this visit:    1. Paroxysmal atrial fibrillation (Primary)  Assessment & Plan:  Symptoms are well-controlled with current dose of metoprolol and Cardizem CD.  Will continue Eliquis for anticoagulation.  Will do CBC and CMP now.    Orders:  -     Cancel: CBC (No Diff); Future  -     CBC (No Diff); Future    2. Essential hypertension  Assessment & Plan:  Blood pressure is mildly elevated in the office today.  He reports having high diastolic blood pressure on a consistent basis at home.  Will increase the dose of hydralazine to 50 mg 3 times daily.  Continue chlorthalidone, olmesartan, metoprolol and Cardizem CD without changes.  Will continue potassium supplementation.  CMP to be done today.    Orders:  -     hydrALAZINE (APRESOLINE) 50 MG tablet; Take 1 tablet by mouth 3 (Three) Times a Day.  Dispense: 270 tablet; Refill: 3    3. Ascending aorta dilatation  Assessment & Plan:  CT scan done in May, 2024 showed a  ascending aortic dimension of 4.0, unchanged from previous years.  Antihypertensive medications being adjusted as mentioned above.      4. Mixed hyperlipidemia  Assessment & Plan:  No recent lipid panel available for review.  Will repeat lipid panel with next lab draw and adjust dose of atorvastatin as needed.    Orders:  -     Cancel: Comprehensive Metabolic Panel; Future  -     Cancel: Lipid Panel; Future  -     Comprehensive Metabolic Panel; Future  -     Lipid Panel; Future              Follow Up     Return in about 4 months (around 7/10/2025) for Next scheduled follow up.    Patient was given instructions and counseling regarding his condition or for health maintenance advice. Please see specific information pulled into the AVS if appropriate.

## 2025-03-27 RX ORDER — CHLORTHALIDONE 25 MG/1
25 TABLET ORAL DAILY
Qty: 90 TABLET | Refills: 0 | Status: SHIPPED | OUTPATIENT
Start: 2025-03-27

## 2025-03-31 DIAGNOSIS — I48.0 PAROXYSMAL ATRIAL FIBRILLATION: ICD-10-CM

## 2025-05-09 DIAGNOSIS — I10 ESSENTIAL HYPERTENSION: ICD-10-CM

## 2025-05-13 DIAGNOSIS — I10 ESSENTIAL HYPERTENSION: ICD-10-CM

## 2025-05-13 RX ORDER — OLMESARTAN MEDOXOMIL 40 MG/1
40 TABLET ORAL DAILY
Qty: 90 TABLET | Refills: 0 | Status: SHIPPED | OUTPATIENT
Start: 2025-05-13

## 2025-05-13 RX ORDER — DILTIAZEM HYDROCHLORIDE 360 MG/1
360 CAPSULE, EXTENDED RELEASE ORAL DAILY
Qty: 90 CAPSULE | Refills: 1 | Status: SHIPPED | OUTPATIENT
Start: 2025-05-13

## 2025-05-13 NOTE — TELEPHONE ENCOUNTER
Rx Refill Note  Requested Prescriptions     Pending Prescriptions Disp Refills    olmesartan (BENICAR) 40 MG tablet 90 tablet 1     Sig: Take 1 tablet by mouth Daily.        LAST OFFICE VISIT:  03/10/2025     NEXT OFFICE VISIT:  08/04/2025     Does the medication requests match the last office note:    [x] Yes   [] No    Does this refill request meet protocol details for MA to approve:     [] Yes   [x] No   [] No Protocols Provided   Normal serum potassium on file within the past year    BP under 130/80 in past year and patient has diabetes, CAD or PVD

## 2025-05-16 RX ORDER — OLMESARTAN MEDOXOMIL 40 MG/1
40 TABLET ORAL DAILY
Qty: 90 TABLET | Refills: 3 | OUTPATIENT
Start: 2025-05-16

## 2025-06-11 NOTE — TELEPHONE ENCOUNTER
Rx Refill Note  Requested Prescriptions     Pending Prescriptions Disp Refills    chlorthalidone (HYGROTON) 25 MG tablet [Pharmacy Med Name: Chlorthalidone 25 MG Oral Tablet] 90 tablet 0     Sig: Take 1 tablet by mouth once daily        LAST OFFICE VISIT:  03/10/2025     NEXT OFFICE VISIT:  08/04/2025     Does the medication requests match the last office note:    [x] Yes   [] No    Does this refill request meet protocol details for MA to approve:     [] Yes   [x] No   [] No Protocols Provided   Normal serum potassium in past 12 months    Normal serum sodium in past 12 months    GFR> 30 ml/min in past year   MOST RECENT LABS DONE 03/28/2025, SCANNED IN MEDIA.

## 2025-06-12 RX ORDER — CHLORTHALIDONE 25 MG/1
25 TABLET ORAL DAILY
Qty: 90 TABLET | Refills: 3 | Status: SHIPPED | OUTPATIENT
Start: 2025-06-12

## 2025-08-01 DIAGNOSIS — E78.2 MIXED HYPERLIPIDEMIA: ICD-10-CM

## 2025-08-01 RX ORDER — ROSUVASTATIN CALCIUM 20 MG/1
20 TABLET, COATED ORAL DAILY
Qty: 90 TABLET | Refills: 3 | Status: SHIPPED | OUTPATIENT
Start: 2025-08-01

## 2025-08-01 NOTE — TELEPHONE ENCOUNTER
Rx Refill Note  Requested Prescriptions     Pending Prescriptions Disp Refills    rosuvastatin (CRESTOR) 20 MG tablet [Pharmacy Med Name: Rosuvastatin Calcium 20 MG Oral Tablet] 90 tablet 0     Sig: Take 1 tablet by mouth once daily        LAST OFFICE VISIT:  3/10/2025     NEXT OFFICE VISIT:  08/04/2025     Does the medication requests match the last office note:    [] Yes   [x] No  Will repeat lipid panel with next lab draw and adjust dose of atorvastatin as needed     Does this refill request meet protocol details for MA to approve:     [] Yes   [x] No   [] No Protocols Provided   Lipid panel in past 12 months    ALK Phos in past 12 months    ALT in past 12 months    AST in past 12 months     MOST RECENT LABS SCANNED IN FROM 03/28/2025

## (undated) DEVICE — GOWN,REINFORCE,POLY,SIRUS,BREATH SLV,XLG: Brand: MEDLINE

## (undated) DEVICE — TRY PREP SCRB VAG PVP

## (undated) DEVICE — CORD ENDO BOVIE 1P/U 10FT

## (undated) DEVICE — PAD GRND REM POLYHESIVE A/ DISP

## (undated) DEVICE — NON-ADHERENT PADS PREPACK: Brand: TELFA

## (undated) DEVICE — GLV SURG SENSICARE SLT PF LF 6.5 STRL

## (undated) DEVICE — Y-TYPE TUR/BLADDER IRRIGATION SET, REGULATING CLAMP

## (undated) DEVICE — SOL IRRG H2O BG 3000ML STRL

## (undated) DEVICE — Device

## (undated) DEVICE — CYSTO PACK: Brand: MEDLINE INDUSTRIES, INC.